# Patient Record
Sex: MALE | Race: BLACK OR AFRICAN AMERICAN | NOT HISPANIC OR LATINO | Employment: OTHER | URBAN - METROPOLITAN AREA
[De-identification: names, ages, dates, MRNs, and addresses within clinical notes are randomized per-mention and may not be internally consistent; named-entity substitution may affect disease eponyms.]

---

## 2023-04-27 ENCOUNTER — HOSPITAL ENCOUNTER (EMERGENCY)
Facility: HOSPITAL | Age: 68
Discharge: HOME | End: 2023-04-27
Attending: EMERGENCY MEDICINE
Payer: MEDICARE

## 2023-04-27 VITALS
TEMPERATURE: 99 F | BODY MASS INDEX: 26.36 KG/M2 | HEART RATE: 72 BPM | OXYGEN SATURATION: 99 % | HEIGHT: 69 IN | DIASTOLIC BLOOD PRESSURE: 87 MMHG | SYSTOLIC BLOOD PRESSURE: 190 MMHG | RESPIRATION RATE: 18 BRPM | WEIGHT: 178 LBS

## 2023-04-27 DIAGNOSIS — M54.50 ACUTE BILATERAL LOW BACK PAIN, UNSPECIFIED WHETHER SCIATICA PRESENT: Primary | ICD-10-CM

## 2023-04-27 DIAGNOSIS — M54.2 NECK PAIN: ICD-10-CM

## 2023-04-27 PROCEDURE — 25000003 PHARM REV CODE 250: Performed by: EMERGENCY MEDICINE

## 2023-04-27 PROCEDURE — 99284 EMERGENCY DEPT VISIT MOD MDM: CPT

## 2023-04-27 RX ORDER — OXYCODONE HYDROCHLORIDE 5 MG/1
15 TABLET ORAL
Status: COMPLETED | OUTPATIENT
Start: 2023-04-27 | End: 2023-04-27

## 2023-04-27 RX ADMIN — OXYCODONE 15 MG: 5 TABLET ORAL at 04:04

## 2023-04-27 NOTE — ED TRIAGE NOTES
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 Pt reports chronic neck and lower back pain which is now exacerbated after injury. Pt denies head injury

## 2023-04-27 NOTE — DISCHARGE INSTRUCTIONS
Xrays x-rays do not show fracture or new injury although they do show arthritis.  Continue to use your pain medication as needed.  Your blood pressure was elevated in the emergency department this may be related to stress or discomfort.  Schedule close follow-up with your primary physician to monitor your blood pressure determine if any blood pressure medications are warranted.    Thank you for coming to our Emergency Department today. It is important to remember that some problems are difficult to diagnose and may not be found during your first visit. Be sure to follow up with your primary care doctor and review any labs/imaging that was performed with them. If you do not have a primary care doctor, you may contact the one listed on your discharge paperwork or you may also call the [facility] Appointment Desk at [phone] to schedule an appointment with one.     All medications may potentially have side effects and it is impossible to predict which medications may give you side effects. If you feel that you are having a negative effect of any medication you should immediately stop taking them and seek medical attention.    Return to the ER with any questions/concerns, new/concerning symptoms, worsening or failure to improve. Do not drive or make any important decisions for 24 hours if you have received any pain medications, sedatives or mood altering drugs during your ER visit.

## 2023-04-27 NOTE — ED PROVIDER NOTES
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 Pt reports chronic neck and lower back pain which is now exacerbated after injury. Pt denies head injury     66yo male with hx of cervical and lumbar degenerative disc disease presents to the ED with neck and back pain beginning after he was struck with a clothing rack in the store causing him to turn quickly and grab onto a pole to prevent himself from from falling. Denies headstrike or LOC. No new numbness or weakness. No bowel or bladder symptoms. No treatments attempted prior to arrival.    Review of patient's allergies indicates:  No Known Allergies  History reviewed. No pertinent past medical history.  History reviewed. No pertinent surgical history.  History reviewed. No pertinent family history.     Review of Systems   Constitutional:  Negative for fever.   HENT:  Negative for facial swelling and sore throat.    Eyes:  Negative for pain and discharge.   Respiratory:  Negative for choking and shortness of breath.    Cardiovascular:  Negative for chest pain.   Gastrointestinal:  Negative for abdominal pain, nausea and vomiting.   Genitourinary:  Negative for dysuria and frequency.   Musculoskeletal:  Positive for back pain and neck pain.   Skin:  Negative for rash.   Neurological:  Negative for weakness and numbness.     Physical Exam     Initial Vitals [04/27/23 1558]   BP Pulse Resp Temp SpO2   (!) 224/109 70 20 97.7 °F (36.5 °C) 100 %      MAP       --         Physical Exam    Nursing note and vitals reviewed.  Constitutional: He is not diaphoretic. No distress.   HENT:   Head: Normocephalic and atraumatic.   Protecting airway   Eyes: Conjunctivae and EOM are normal. No scleral icterus.    Neck: Neck supple. No tracheal deviation present.   Normal range of motion.  Cardiovascular:  Normal rate, regular rhythm and intact distal pulses.           Pulmonary/Chest: No stridor. No respiratory distress.   Speaking in full sentences   Abdominal: Abdomen is soft. He exhibits no distension. There is no abdominal tenderness.   Musculoskeletal:         General: No edema.      Cervical back: Normal range of motion and neck supple.      Comments: Bilateral paraspinal lumbar tenderness  Bilateral paraspinal cervical tenderness.  No midline TTP      Neurological: He is alert. He has normal strength. No cranial nerve deficit or sensory deficit.   Skin: Skin is warm and dry.   Psychiatric: He has a normal mood and affect.       ED Course   Procedures  Labs Reviewed - No data to display       Imaging Results              X-Ray Lumbar Spine Ap And Lateral (Final result)  Result time 04/27/23 16:35:38      Final result by Vinod Jennings MD (04/27/23 16:35:38)                   Impression:      1. No acute displaced fracture or dislocation of the lumbar spine.  2. Questionable height loss involving the superior endplate of T12 versus degenerative change.  No previous exams are available for comparison.  Correlation is advised with any focal tenderness.      Electronically signed by: Vinod Jennings MD  Date:    04/27/2023  Time:    16:35               Narrative:    EXAMINATION:  XR LUMBAR SPINE AP AND LATERAL    CLINICAL HISTORY:  low back pain;    TECHNIQUE:  AP, lateral and spot images were performed of the lumbar spine.    COMPARISON:  None    FINDINGS:  Three views lumbar spine.    Lateral imaging demonstrates minimal grade 1 anterolisthesis of L4 on L5.  No significant vertebral body height loss.  There is disc space height loss involving L5-S1.  There is questionable height loss involving the superior endplate of T12 noting degenerative change at this level may account for the same.  There is multilevel facet  arthropathy.  The visualized sacral segments are aligned.  AP spinal alignment is remarkable for mild dextroscoliotic curvature.  The bilateral sacroiliac joints are intact.  There is vascular calcification.                                       X-Ray Cervical Spine AP And Lateral (Final result)  Result time 04/27/23 16:33:12      Final result by Vinod Jennings MD (04/27/23 16:33:12)                   Impression:      1. No acute displaced fracture or dislocation of the cervical spine.      Electronically signed by: Vinod Jennings MD  Date:    04/27/2023  Time:    16:33               Narrative:    EXAMINATION:  XR CERVICAL SPINE AP LATERAL    CLINICAL HISTORY:  Cervicalgia    TECHNIQUE:  AP, lateral and open mouth views of the cervical spine were performed.    COMPARISON:  None.    FINDINGS:  Four views cervical spine.    Lateral imaging demonstrates adequate alignment of the cervical spine without significant vertebral body height loss.  There is disc space height loss involving C5-C6.  Disc degenerative disease primarily involves C5-C6.  The facet joints are aligned.  AP spinal alignment is unremarkable.  There is facet arthropathy.  The visualized upper lung zones are grossly clear.  The odontoid is intact.  The lateral masses of C1 are in anatomic relationship with C2.  Paraspinous and hypopharyngeal soft tissues are unremarkable.  The airway is patent.                                       Medications   oxyCODONE immediate release tablet 15 mg (15 mg Oral Given 4/27/23 1636)     Medical Decision Making:   History:   Old Medical Records: I decided to obtain old medical records.  Differential Diagnosis:   Includes but not limited to: strain, sprain, fracture, dialocation  Clinical Tests:   Radiological Study: Ordered and Reviewed  ED Management:  Patient is afebrile and in no acute distress at time history and physical.  Vitals within acceptable ranges.  He has no midline vertebral tenderness or step-offs.   He has full and symmetrical strength and sensation in bilateral upper and lower extremities.  Patient is hypertensive.  He denies current headache, chest pain, numbness or weakness.  Patient reports history of elevated blood pressures in the doctor's office.  Reports that he sees is Dr. Bacon and is not on antihypertensives.  X-rays without acute fracture or spondylolisthesis.  There is note of chronic degenerative changes.  Patient given his previously prescribed oxycodone.  On reassessment he reports some improvement in symptoms. He is ambulatory without difficulty. He is stable for discharge on trial of management with his previously prescribed pain medications.  Patient and wife at the bedside counseled on the importance of close follow-up with primary physician for blood pressure checks to monitor blood pressure and determine if he needs systolic blood pressure medications. counseled on supportive care, appropriate medication usage, concerning symptoms for which to return to ER and the importance of follow up. Understanding and agreement with treatment plan was expressed.   This chart was completed using dictation software, as a result there may be some transcription errors.                           Clinical Impression:   Final diagnoses:  [M54.2] Neck pain  [M54.50] Acute bilateral low back pain, unspecified whether sciatica present (Primary)        ED Disposition Condition    Discharge Stable          ED Prescriptions    None       Follow-up Information       Follow up With Specialties Details Why Contact Info    Jacinta King MD Family Medicine Schedule an appointment as soon as possible for a visit   [address] [address] [phone]               Massimo Monsalve MD  04/27/23 0335

## 2023-05-31 ENCOUNTER — HOSPITAL ENCOUNTER (OUTPATIENT)
Facility: HOSPITAL | Age: 68
Discharge: HOME | End: 2023-06-01
Attending: EMERGENCY MEDICINE | Admitting: STUDENT IN AN ORGANIZED HEALTH CARE EDUCATION/TRAINING PROGRAM
Payer: MEDICARE

## 2023-05-31 DIAGNOSIS — Z86.79 HISTORY OF CORONARY ARTERY DISEASE: ICD-10-CM

## 2023-05-31 DIAGNOSIS — R07.9 CHEST PAIN: ICD-10-CM

## 2023-05-31 DIAGNOSIS — R07.9 CHEST PAIN WITH MODERATE RISK FOR CARDIAC ETIOLOGY: Primary | ICD-10-CM

## 2023-05-31 PROBLEM — D50.9 IRON DEFICIENCY ANEMIA: Chronic | Status: ACTIVE | Noted: 2020-02-21

## 2023-05-31 PROBLEM — G72.0 STATIN MYOPATHY: Chronic | Status: ACTIVE | Noted: 2019-10-25

## 2023-05-31 PROBLEM — M10.9 GOUT, UNSPECIFIED: Chronic | Status: ACTIVE | Noted: 2017-01-03

## 2023-05-31 PROBLEM — M79.10 MYALGIA DUE TO STATIN: Chronic | Status: ACTIVE | Noted: 2019-07-26

## 2023-05-31 PROBLEM — G72.0 STATIN MYOPATHY: Status: ACTIVE | Noted: 2019-10-25

## 2023-05-31 PROBLEM — T46.6X5A STATIN MYOPATHY: Chronic | Status: ACTIVE | Noted: 2019-10-25

## 2023-05-31 PROBLEM — T46.6X5A MYALGIA DUE TO STATIN: Chronic | Status: ACTIVE | Noted: 2019-07-26

## 2023-05-31 PROBLEM — M15.9 DEGENERATIVE JOINT DISEASE INVOLVING MULTIPLE JOINTS: Chronic | Status: ACTIVE | Noted: 2018-05-01

## 2023-05-31 PROBLEM — D50.9 IRON DEFICIENCY ANEMIA: Status: ACTIVE | Noted: 2020-02-21

## 2023-05-31 PROBLEM — F11.90 CHRONIC NARCOTIC USE: Chronic | Status: ACTIVE | Noted: 2018-11-01

## 2023-05-31 PROBLEM — E78.5 HLD (HYPERLIPIDEMIA): Chronic | Status: ACTIVE | Noted: 2022-12-27

## 2023-05-31 PROBLEM — I10 ESSENTIAL HYPERTENSION: Chronic | Status: ACTIVE | Noted: 2018-05-01

## 2023-05-31 PROBLEM — E78.5 HLD (HYPERLIPIDEMIA): Status: ACTIVE | Noted: 2022-12-27

## 2023-05-31 PROBLEM — I10 ESSENTIAL HYPERTENSION: Status: ACTIVE | Noted: 2018-05-01

## 2023-05-31 PROBLEM — T46.6X5A STATIN MYOPATHY: Status: ACTIVE | Noted: 2019-10-25

## 2023-05-31 PROBLEM — F41.9 ANXIETY: Status: ACTIVE | Noted: 2023-05-31

## 2023-05-31 PROBLEM — T46.6X5A MYALGIA DUE TO STATIN: Status: ACTIVE | Noted: 2019-07-26

## 2023-05-31 PROBLEM — I25.10 ARTERIOSCLEROSIS OF CORONARY ARTERY: Status: ACTIVE | Noted: 2018-05-01

## 2023-05-31 PROBLEM — M15.9 DEGENERATIVE JOINT DISEASE INVOLVING MULTIPLE JOINTS: Status: ACTIVE | Noted: 2018-05-01

## 2023-05-31 PROBLEM — M79.10 MYALGIA DUE TO STATIN: Status: ACTIVE | Noted: 2019-07-26

## 2023-05-31 PROBLEM — I25.10 ARTERIOSCLEROSIS OF CORONARY ARTERY: Chronic | Status: ACTIVE | Noted: 2018-05-01

## 2023-05-31 PROBLEM — E78.00 PURE HYPERCHOLESTEROLEMIA: Status: ACTIVE | Noted: 2018-05-01

## 2023-05-31 PROBLEM — F41.9 ANXIETY: Chronic | Status: ACTIVE | Noted: 2023-05-31

## 2023-05-31 PROBLEM — F11.90 CHRONIC NARCOTIC USE: Status: ACTIVE | Noted: 2018-11-01

## 2023-05-31 PROBLEM — M10.9 GOUT, UNSPECIFIED: Status: ACTIVE | Noted: 2017-01-03

## 2023-05-31 LAB
ALBUMIN SERPL BCP-MCNC: 4.2 G/DL (ref 3.5–5.2)
ALP SERPL-CCNC: 35 U/L (ref 55–135)
ALT SERPL W/O P-5'-P-CCNC: 18 U/L (ref 10–44)
ANION GAP SERPL CALC-SCNC: 9 MMOL/L (ref 8–16)
AST SERPL-CCNC: 22 U/L (ref 10–40)
BASOPHILS # BLD AUTO: 0.04 K/UL (ref 0–0.2)
BASOPHILS NFR BLD: 0.9 % (ref 0–1.9)
BILIRUB SERPL-MCNC: 0.9 MG/DL (ref 0.1–1)
BNP SERPL-MCNC: <10 PG/ML (ref 0–99)
BUN SERPL-MCNC: 14 MG/DL (ref 8–23)
CALCIUM SERPL-MCNC: 9.3 MG/DL (ref 8.7–10.5)
CHLORIDE SERPL-SCNC: 103 MMOL/L (ref 95–110)
CO2 SERPL-SCNC: 27 MMOL/L (ref 23–29)
CREAT SERPL-MCNC: 1.2 MG/DL (ref 0.5–1.4)
DIFFERENTIAL METHOD: ABNORMAL
EOSINOPHIL # BLD AUTO: 0.4 K/UL (ref 0–0.5)
EOSINOPHIL NFR BLD: 7.9 % (ref 0–8)
ERYTHROCYTE [DISTWIDTH] IN BLOOD BY AUTOMATED COUNT: 12.9 % (ref 11.5–14.5)
EST. GFR  (NO RACE VARIABLE): >60 ML/MIN/1.73 M^2
GLUCOSE SERPL-MCNC: 96 MG/DL (ref 70–110)
HCT VFR BLD AUTO: 38 % (ref 40–54)
HGB BLD-MCNC: 12.2 G/DL (ref 14–18)
IMM GRANULOCYTES # BLD AUTO: 0.01 K/UL (ref 0–0.04)
IMM GRANULOCYTES NFR BLD AUTO: 0.2 % (ref 0–0.5)
LYMPHOCYTES # BLD AUTO: 2.3 K/UL (ref 1–4.8)
LYMPHOCYTES NFR BLD: 51.5 % (ref 18–48)
MAGNESIUM SERPL-MCNC: 1.6 MG/DL (ref 1.6–2.6)
MCH RBC QN AUTO: 25.5 PG (ref 27–31)
MCHC RBC AUTO-ENTMCNC: 32.1 G/DL (ref 32–36)
MCV RBC AUTO: 79 FL (ref 82–98)
MONOCYTES # BLD AUTO: 0.3 K/UL (ref 0.3–1)
MONOCYTES NFR BLD: 6.7 % (ref 4–15)
NEUTROPHILS # BLD AUTO: 1.5 K/UL (ref 1.8–7.7)
NEUTROPHILS NFR BLD: 32.8 % (ref 38–73)
NRBC BLD-RTO: 0 /100 WBC
PLATELET # BLD AUTO: 144 K/UL (ref 150–450)
PMV BLD AUTO: 11.7 FL (ref 9.2–12.9)
POTASSIUM SERPL-SCNC: 3.9 MMOL/L (ref 3.5–5.1)
PROT SERPL-MCNC: 7.2 G/DL (ref 6–8.4)
RBC # BLD AUTO: 4.79 M/UL (ref 4.6–6.2)
SODIUM SERPL-SCNC: 139 MMOL/L (ref 136–145)
TROPONIN I SERPL DL<=0.01 NG/ML-MCNC: <0.006 NG/ML (ref 0–0.03)
WBC # BLD AUTO: 4.45 K/UL (ref 3.9–12.7)

## 2023-05-31 PROCEDURE — 99223 1ST HOSP IP/OBS HIGH 75: CPT | Mod: ,,, | Performed by: INTERNAL MEDICINE

## 2023-05-31 PROCEDURE — 99285 EMERGENCY DEPT VISIT HI MDM: CPT | Mod: 25

## 2023-05-31 PROCEDURE — 84484 ASSAY OF TROPONIN QUANT: CPT | Mod: 91 | Performed by: PHYSICIAN ASSISTANT

## 2023-05-31 PROCEDURE — 63600175 PHARM REV CODE 636 W HCPCS: Performed by: EMERGENCY MEDICINE

## 2023-05-31 PROCEDURE — 93010 EKG 12-LEAD: ICD-10-PCS | Mod: ,,, | Performed by: INTERNAL MEDICINE

## 2023-05-31 PROCEDURE — 80053 COMPREHEN METABOLIC PANEL: CPT | Performed by: PHYSICIAN ASSISTANT

## 2023-05-31 PROCEDURE — 36415 COLL VENOUS BLD VENIPUNCTURE: CPT | Performed by: HOSPITALIST

## 2023-05-31 PROCEDURE — 83880 ASSAY OF NATRIURETIC PEPTIDE: CPT | Performed by: PHYSICIAN ASSISTANT

## 2023-05-31 PROCEDURE — 25000003 PHARM REV CODE 250: Performed by: HOSPITALIST

## 2023-05-31 PROCEDURE — G0378 HOSPITAL OBSERVATION PER HR: HCPCS

## 2023-05-31 PROCEDURE — 96372 THER/PROPH/DIAG INJ SC/IM: CPT | Performed by: EMERGENCY MEDICINE

## 2023-05-31 PROCEDURE — 99223 PR INITIAL HOSPITAL CARE,LEVL III: ICD-10-PCS | Mod: ,,, | Performed by: INTERNAL MEDICINE

## 2023-05-31 PROCEDURE — 93010 ELECTROCARDIOGRAM REPORT: CPT | Mod: ,,, | Performed by: INTERNAL MEDICINE

## 2023-05-31 PROCEDURE — 25000003 PHARM REV CODE 250: Performed by: EMERGENCY MEDICINE

## 2023-05-31 PROCEDURE — 93005 ELECTROCARDIOGRAM TRACING: CPT

## 2023-05-31 PROCEDURE — 85025 COMPLETE CBC W/AUTO DIFF WBC: CPT | Performed by: PHYSICIAN ASSISTANT

## 2023-05-31 PROCEDURE — 83735 ASSAY OF MAGNESIUM: CPT | Performed by: PHYSICIAN ASSISTANT

## 2023-05-31 PROCEDURE — 84484 ASSAY OF TROPONIN QUANT: CPT | Mod: 91 | Performed by: HOSPITALIST

## 2023-05-31 RX ORDER — MAG HYDROX/ALUMINUM HYD/SIMETH 200-200-20
30 SUSPENSION, ORAL (FINAL DOSE FORM) ORAL 4 TIMES DAILY PRN
Status: DISCONTINUED | OUTPATIENT
Start: 2023-05-31 | End: 2023-06-01 | Stop reason: HOSPADM

## 2023-05-31 RX ORDER — OXYCODONE HYDROCHLORIDE 15 MG/1
15 TABLET ORAL EVERY 6 HOURS PRN
Status: DISCONTINUED | OUTPATIENT
Start: 2023-05-31 | End: 2023-06-01 | Stop reason: HOSPADM

## 2023-05-31 RX ORDER — DEXTROSE 40 %
15 GEL (GRAM) ORAL
Status: DISCONTINUED | OUTPATIENT
Start: 2023-05-31 | End: 2023-06-01 | Stop reason: HOSPADM

## 2023-05-31 RX ORDER — OXYCODONE HYDROCHLORIDE 15 MG/1
TABLET ORAL
COMMUNITY
Start: 2023-05-19

## 2023-05-31 RX ORDER — ACETAMINOPHEN 325 MG/1
650 TABLET ORAL EVERY 6 HOURS PRN
Status: DISCONTINUED | OUTPATIENT
Start: 2023-05-31 | End: 2023-06-01 | Stop reason: HOSPADM

## 2023-05-31 RX ORDER — AMLODIPINE BESYLATE 5 MG/1
5 TABLET ORAL DAILY
COMMUNITY
Start: 2022-12-27

## 2023-05-31 RX ORDER — AMLODIPINE BESYLATE 5 MG/1
5 TABLET ORAL DAILY
Status: DISCONTINUED | OUTPATIENT
Start: 2023-06-01 | End: 2023-06-01 | Stop reason: HOSPADM

## 2023-05-31 RX ORDER — NALOXONE HCL 0.4 MG/ML
0.02 VIAL (ML) INJECTION
Status: DISCONTINUED | OUTPATIENT
Start: 2023-05-31 | End: 2023-06-01 | Stop reason: HOSPADM

## 2023-05-31 RX ORDER — DEXTROSE 40 %
30 GEL (GRAM) ORAL
Status: DISCONTINUED | OUTPATIENT
Start: 2023-05-31 | End: 2023-06-01 | Stop reason: HOSPADM

## 2023-05-31 RX ORDER — ASPIRIN 81 MG/1
81 TABLET ORAL DAILY
Status: DISCONTINUED | OUTPATIENT
Start: 2023-06-01 | End: 2023-06-01 | Stop reason: HOSPADM

## 2023-05-31 RX ORDER — ASPIRIN 81 MG/1
81 TABLET ORAL
COMMUNITY

## 2023-05-31 RX ORDER — ONDANSETRON 2 MG/ML
4 INJECTION INTRAMUSCULAR; INTRAVENOUS EVERY 8 HOURS PRN
Status: DISCONTINUED | OUTPATIENT
Start: 2023-05-31 | End: 2023-06-01 | Stop reason: HOSPADM

## 2023-05-31 RX ORDER — SIMETHICONE 80 MG
1 TABLET,CHEWABLE ORAL 4 TIMES DAILY PRN
Status: DISCONTINUED | OUTPATIENT
Start: 2023-05-31 | End: 2023-06-01 | Stop reason: HOSPADM

## 2023-05-31 RX ORDER — PANTOPRAZOLE SODIUM 40 MG/1
80 TABLET, DELAYED RELEASE ORAL
Status: COMPLETED | OUTPATIENT
Start: 2023-05-31 | End: 2023-05-31

## 2023-05-31 RX ORDER — OXYCODONE HYDROCHLORIDE 5 MG/1
15 TABLET ORAL
Status: COMPLETED | OUTPATIENT
Start: 2023-05-31 | End: 2023-05-31

## 2023-05-31 RX ORDER — GLUCAGON 1 MG
1 KIT INJECTION
Status: DISCONTINUED | OUTPATIENT
Start: 2023-05-31 | End: 2023-06-01 | Stop reason: HOSPADM

## 2023-05-31 RX ORDER — SODIUM CHLORIDE 0.9 % (FLUSH) 0.9 %
10 SYRINGE (ML) INJECTION EVERY 8 HOURS PRN
Status: DISCONTINUED | OUTPATIENT
Start: 2023-05-31 | End: 2023-06-01 | Stop reason: HOSPADM

## 2023-05-31 RX ORDER — HYDROMORPHONE HYDROCHLORIDE 1 MG/ML
1 INJECTION, SOLUTION INTRAMUSCULAR; INTRAVENOUS; SUBCUTANEOUS
Status: COMPLETED | OUTPATIENT
Start: 2023-05-31 | End: 2023-05-31

## 2023-05-31 RX ORDER — POLYETHYLENE GLYCOL 3350 17 G/17G
17 POWDER, FOR SOLUTION ORAL DAILY
Status: DISCONTINUED | OUTPATIENT
Start: 2023-05-31 | End: 2023-06-01 | Stop reason: HOSPADM

## 2023-05-31 RX ORDER — TALC
6 POWDER (GRAM) TOPICAL NIGHTLY PRN
Status: DISCONTINUED | OUTPATIENT
Start: 2023-05-31 | End: 2023-06-01 | Stop reason: HOSPADM

## 2023-05-31 RX ORDER — MAG HYDROX/ALUMINUM HYD/SIMETH 200-200-20
60 SUSPENSION, ORAL (FINAL DOSE FORM) ORAL
Status: COMPLETED | OUTPATIENT
Start: 2023-05-31 | End: 2023-05-31

## 2023-05-31 RX ORDER — PROCHLORPERAZINE EDISYLATE 5 MG/ML
5 INJECTION INTRAMUSCULAR; INTRAVENOUS EVERY 6 HOURS PRN
Status: DISCONTINUED | OUTPATIENT
Start: 2023-05-31 | End: 2023-06-01 | Stop reason: HOSPADM

## 2023-05-31 RX ADMIN — OXYCODONE HYDROCHLORIDE 15 MG: 5 TABLET ORAL at 08:05

## 2023-05-31 RX ADMIN — ALUMINUM HYDROXIDE, MAGNESIUM HYDROXIDE, AND DIMETHICONE 60 ML: 200; 20; 200 SUSPENSION ORAL at 08:05

## 2023-05-31 RX ADMIN — PANTOPRAZOLE SODIUM 80 MG: 40 TABLET, DELAYED RELEASE ORAL at 08:05

## 2023-05-31 RX ADMIN — OXYCODONE HYDROCHLORIDE 15 MG: 15 TABLET ORAL at 11:05

## 2023-05-31 RX ADMIN — HYDROMORPHONE HYDROCHLORIDE 1 MG: 1 INJECTION, SOLUTION INTRAMUSCULAR; INTRAVENOUS; SUBCUTANEOUS at 08:05

## 2023-05-31 NOTE — ASSESSMENT & PLAN NOTE
Chest pain in a patient with past medical history significant for coronary artery disease.  Will do further evaluation with the help of a stress test.  Check 2D echocardiogram.

## 2023-05-31 NOTE — HPI
67 y.o. male with HTN, HLD, and CAD presents with a complaint of chest pain.  Pain is acute onset yesterday evening, located mid chest, attempted self treatment with alkaseltzer with some relief.  Denies fever, chills, cough, SOB, palpitations, orthopnea, PND, dizziness, syncope, n/v/d, or abdominal pain.  Pain resolved with GI cocktail in ED.  EKG without evidence of acute ischemia, troponin negative, otherwise negative for acute abnormality.  Cardiology consulted and recommended observation with stress test tomorrow.

## 2023-05-31 NOTE — CONSULTS
[facility] - Telemetry  Cardiology  Consult Note    Patient Name: Ronald Kelley  MRN: [id number]  Admission Date: 5/31/2023  Hospital Length of Stay: 0 days  Code Status: Full Code   Attending Provider: Rj Riley MD   Consulting Provider: Krupa Claudio MD  Primary Care Physician: Jacinta King MD  Principal Problem:Chest pain    Patient information was obtained from patient and ER records.     Inpatient consult to Cardiology  Consult performed by: Krupa Claudio MD  Consult ordered by: Abel Pa MD        Subjective:     Chief Complaint:  cp     HPI:   Patient is a pleasant 67-year-old man.  Has a past medical history significant for coronary artery disease status post PCI in 2011.  Came in with substernal chest pressure-like chest pain.  States this pain woke him up and he took some antacids, but despite that when he kept on having the pain he came to the ER.  Currently chest pain-free.  Troponins have been negative.  States the pain is different than the pain from his PCI in 2011 which was more like a sharp stabbing pain.  Today it is more like substernal pressure-like pain.  He denies that this is burning-like sensation            Past Medical History:   Diagnosis Date    Coronary artery disease     Hypertension        Past Surgical History:   Procedure Laterality Date    CORONARY STENT PLACEMENT         Review of patient's allergies indicates:  No Known Allergies    No current facility-administered medications on file prior to encounter.     Current Outpatient Medications on File Prior to Encounter   Medication Sig    aspirin (ECOTRIN) 81 MG EC tablet Take 81 mg by mouth.    oxyCODONE (ROXICODONE) 15 MG Tab Take by mouth.    amLODIPine (NORVASC) 5 MG tablet Take 5 mg by mouth once daily.     Family History    None       Tobacco Use    Smoking status: Never    Smokeless tobacco: Never   Substance and Sexual Activity    Alcohol use: Not on file    Drug use: Never    Sexual activity: Not  on file     Review of Systems   Constitutional: Negative.   HENT: Negative.     Eyes: Negative.    Cardiovascular:  Positive for chest pain.   Respiratory: Negative.     Endocrine: Negative.    Hematologic/Lymphatic: Negative.    Skin: Negative.    Musculoskeletal: Negative.    Gastrointestinal: Negative.    Genitourinary: Negative.    Neurological: Negative.    Psychiatric/Behavioral: Negative.     Allergic/Immunologic: Negative.    Objective:     Vital Signs (Most Recent):  Temp: 97.4 °F (36.3 °C) (05/31/23 1627)  Pulse: 65 (05/31/23 1627)  Resp: 18 (05/31/23 1627)  BP: (!) 189/89 (05/31/23 1627)  SpO2: 98 % (05/31/23 1627) Vital Signs (24h Range):  Temp:  [97.4 °F (36.3 °C)-98.5 °F (36.9 °C)] 97.4 °F (36.3 °C)  Pulse:  [50-67] 65  Resp:  [10-18] 18  SpO2:  [96 %-100 %] 98 %  BP: (139-199)/(79-94) 189/89     Weight: 81.6 kg (180 lb)  Body mass index is 26.58 kg/m².    SpO2: 98 %       No intake or output data in the 24 hours ending 05/31/23 1759    Lines/Drains/Airways       Peripheral Intravenous Line  Duration                  Peripheral IV - Single Lumen 05/31/23 0716 20 G Right Antecubital <1 day                     Physical Exam  Vitals reviewed.   Constitutional:       Appearance: He is well-developed.   HENT:      Head: Normocephalic.   Eyes:      Conjunctiva/sclera: Conjunctivae normal.      Pupils: Pupils are equal, round, and reactive to light.   Cardiovascular:      Rate and Rhythm: Normal rate and regular rhythm.      Heart sounds: Normal heart sounds.   Pulmonary:      Effort: Pulmonary effort is normal.      Breath sounds: Normal breath sounds.   Abdominal:      General: Bowel sounds are normal.      Palpations: Abdomen is soft.   Musculoskeletal:      Cervical back: Normal range of motion and neck supple.   Skin:     General: Skin is warm.   Neurological:      Mental Status: He is alert and oriented to person, place, and time.        Significant Labs: BMP:   Recent Labs   Lab 05/31/23  0716   GLU 96       K 3.9      CO2 27   BUN 14   CREATININE 1.2   CALCIUM 9.3   MG 1.6   , CMP   Recent Labs   Lab 05/31/23  0716      K 3.9      CO2 27   GLU 96   BUN 14   CREATININE 1.2   CALCIUM 9.3   PROT 7.2   ALBUMIN 4.2   BILITOT 0.9   ALKPHOS 35*   AST 22   ALT 18   ANIONGAP 9   , CBC   Recent Labs   Lab 05/31/23  0716   WBC 4.45   HGB 12.2*   HCT 38.0*   *   , INR No results for input(s): INR, PROTIME in the last 48 hours., Lipid Panel No results for input(s): CHOL, HDL, LDLCALC, TRIG, CHOLHDL in the last 48 hours., Troponin   Recent Labs   Lab 05/31/23  0716 05/31/23  1005   TROPONINI <0.006 <0.006   , and All pertinent lab results from the last 24 hours have been reviewed.    Significant Imaging: Echocardiogram: Transthoracic echo (TTE) complete (Cupid Only): No results found for this or any previous visit.    Assessment and Plan:     * Chest pain  Chest pain in a patient with past medical history significant for coronary artery disease.  Will do further evaluation with the help of a stress test.  Check 2D echocardiogram.    Essential hypertension        HLD (hyperlipidemia)  As per patient has been intolerant to multiple statins as well as Repatha.  His primary cardiologist has discussed leqvio with the patient as per the notes, but the patient does not remember that conversation.  Consider it as an outpatient        VTE Risk Mitigation (From admission, onward)         Ordered     IP VTE LOW RISK PATIENT  Once         05/31/23 1505     Place sequential compression device  Until discontinued         05/31/23 1505                Thank you for your consult. I will follow-up with patient. Please contact us if you have any additional questions.    Krupa Claudio MD  Cardiology   [facility] [place] [facility] [place]

## 2023-05-31 NOTE — ED PROVIDER NOTES
Encounter Date: 5/31/2023    SCRIBE #1 NOTE: I, Juan Purdy, am scribing for, and in the presence of,  Abel Pa MD. Other sections scribed: HPI, ROS.     History     Chief Complaint   Patient presents with    Chest Pain     Pt presents to the ED with c/o  sub-sternal and left-sided CP since approx 2200 last night. States he took massiel-seltzer with mild relief. Denies n/v. Hx of previous stent placements     CC: Chest pain    HPI: History is provided by independent historian. This is a 67 y.o. M who presents to the ED for emergent evaluation of acute mid-sternal CP that began at 12:00 am today. Pt believed that the CP was due to gas prompting him to take Massiel [place] with minimal relief. The pt also complains of acute mid thoracic back pain that began this morning. He describes the back pain as a pressure-like sensation when sitting up. The pt endorses left side CP. There are no exacerbating factors. Pt denies that the CP is exacerbated with deep breathing. He took an Aspirin today. The pt reports a similar episode of CP 10 years ago, in which a cardiac stent was placed at that time. The pt has chronic back pain that radiates to bilateral lower extremities. The pain in the left lower extremity is greater than in the right lower extremity. No pain in bilateral lower extremities currently. The pt's chronic back pain is managed with Oxycodone, which he has not taken as of yet today. Pt denies fever, chills, diaphoresis, ear pain, sore throat, eye problem, cough, SOB, abdominal pain, nausea, vomiting, diarrhea, dysuria, arm or leg problems, rash, headache, or tobacco use.    The history is provided by the patient. No  was used.   Review of patient's allergies indicates:  No Known Allergies  History reviewed. No pertinent past medical history.  History reviewed. No pertinent surgical history.  History reviewed. No pertinent family history.  Social History     Tobacco Use    Smoking status:  Never    Smokeless tobacco: Never   Substance Use Topics    Drug use: Never     Review of Systems   Constitutional:  Negative for chills, diaphoresis and fever.   HENT:  Negative for sore throat.    Respiratory:  Negative for cough and shortness of breath.    Cardiovascular:  Positive for chest pain.   Gastrointestinal:  Negative for abdominal pain, diarrhea, nausea and vomiting.   Genitourinary:  Negative for dysuria.   Musculoskeletal:  Positive for back pain. Negative for myalgias.   Skin:  Negative for rash.   Neurological:  Negative for weakness and headaches.   Hematological:  Does not bruise/bleed easily.     Physical Exam     Initial Vitals [05/31/23 0657]   BP Pulse Resp Temp SpO2   (!) 141/86 65 16 98.2 °F (36.8 °C) 100 %      MAP       --         Physical Exam  The patient was examined specifically for the following:   General:No significant distress, Good color, Warm and dry. Head and neck:Scalp atraumatic, Neck supple. Neurological:Appropriate conversation, Gross motor deficits. Eyes:Conjugate gaze, Clear corneas. ENT: No epistaxis. Cardiac: Regular rate and rhythm, Grossly normal heart tones. Pulmonary: Wheezing, Rales. Gastrointestinal: Abdominal tenderness, Abdominal distention. Musculoskeletal: Extremity deformity, Apparent pain with range of motion of the joints. Skin: Rash.   The findings on examination were normal .  The patient has brisk bilateral radial pulses.  Lungs are clear and free of wheezing rales rubs or rhonchi.  Heart tones are normal.  The patient has regular rate and rhythm.  The abdomen is nontender.  There is no guarding rebound mass or distention.  ED Course   Critical Care    Date/Time: 6/25/2023 10:41 AM  Performed by: Abel Pa MD  Authorized by: Rj Riley MD   Direct patient critical care time: 22 minutes  Additional history critical care time: 11 minutes  Ordering / reviewing critical care time: 11 minutes  Documentation critical care time: 11  minutes  Consulting other physicians critical care time: 5 minutes  Total critical care time (exclusive of procedural time) : 60 minutes  Critical care time was exclusive of separately billable procedures and treating other patients and teaching time.  Critical care was necessary to treat or prevent imminent or life-threatening deterioration of the following conditions: metabolic crisis and CNS failure or compromise.  Critical care was time spent personally by me on the following activities: discussions with primary provider, development of treatment plan with patient or surrogate, evaluation of patient's response to treatment, examination of patient, obtaining history from patient or surrogate, ordering and performing treatments and interventions, ordering and review of laboratory studies, ordering and review of radiographic studies, pulse oximetry, re-evaluation of patient's condition and review of old charts.      Labs Reviewed   CBC W/ AUTO DIFFERENTIAL - Abnormal; Notable for the following components:       Result Value    Hemoglobin 12.2 (*)     Hematocrit 38.0 (*)     MCV 79 (*)     MCH 25.5 (*)     Platelets 144 (*)     Gran # (ANC) 1.5 (*)     Gran % 32.8 (*)     Lymph % 51.5 (*)     All other components within normal limits   COMPREHENSIVE METABOLIC PANEL - Abnormal; Notable for the following components:    Alkaline Phosphatase 35 (*)     All other components within normal limits   TROPONIN I   B-TYPE NATRIURETIC PEPTIDE   MAGNESIUM   TROPONIN I     EKG Readings: (Independently Interpreted)   This patient is in a normal sinus rhythm with a heart rate of 67.  There are no significant ST segment or T-wave changes.  There is no evidence of acute myocardial infarction or malignant arrhythmia.  There is poor R-wave progression across precordium.  The axis is normal.  Intervals are normal.     Imaging Results              X-Ray Chest AP Portable (Final result)  Result time 05/31/23 08:05:05      Final result by  Luisito Guan MD (05/31/23 08:05:05)                   Impression:      No acute abnormality.      Electronically signed by: Luisito Guan MD  Date:    05/31/2023  Time:    08:05               Narrative:    EXAMINATION:  XR CHEST AP PORTABLE    CLINICAL HISTORY:  Chest Pain;    TECHNIQUE:  Single view of the chest were performed.    COMPARISON:  Chest radiograph dated January 8, 2011    FINDINGS:  The trachea and cardiomediastinal silhouette are within normal limits.  There is no evidence of pleural effusions, pneumothoraces or consolidations.  Lungs are clear.  Osseous structures demonstrate no evidence for acute fractures or dislocations.                                    Medical decision making: Given the above this patient presents emergency room with dull heavy chest pressure that began at midnight last night lasted about 2 hours.  The patient has known coronary artery disease.  He has a stent.  EKG does not show ST segment elevation myocardial infarction, 2 troponins are -3 hours apart.  I doubt myocardial infarction.  This could have been anginal chest pain.  The patient has a heart score of 4.  I recommended consult with Cardiology and consideration of hospital admission.  The patient flatly declined both cardiology consult and admission to the hospital.  He would like to see his cardiologist.  Office evaluation before the weekend was recommended.  The patient is undertaking to arrange that now.   After deliberation, the family inpatient decided to accept cardiology consultation.  The patient was seen by  in the emergency room.  Recommended admission.  I will place the patient to observation.  I will continue to trend troponins.  I will defer further care and treatment to Hospital Medicine.         Medications   HYDROmorphone injection 1 mg (1 mg Intramuscular Given 5/31/23 0809)   oxyCODONE immediate release tablet 15 mg (15 mg Oral Given 5/31/23 0808)   pantoprazole EC tablet 80 mg (80 mg Oral Given  5/31/23 0808)   aluminum-magnesium hydroxide-simethicone 200-200-20 mg/5 mL suspension 60 mL (60 mLs Oral Given 5/31/23 0808)        Additional MDM:   Heart Score:    History:          Slightly suspicious.  ECG:             Normal  Age:               >65 years  Risk factors: >= 3 risk factors or history of atherosclerotic disease  Troponin:       Less than or equal to normal limit  Final Score: 4       Scribe Attestation:   Scribe #1: I performed the above scribed service and the documentation accurately describes the services I performed. I attest to the accuracy of the note.                 Scribe I personally performed the services described in this documentation.  All medical record  entries made by the scribe are at my direction and in my presence.  Signed, Dr. Pa  Clinical Impression:   Final diagnoses:  [R07.9] Chest pain  [Z86.79] History of coronary artery disease (Primary)  [R07.9] Chest pain with moderate risk for cardiac etiology        ED Disposition Condition    Observation Stable                Abel Pa MD  05/31/23 1636       Abel Pa MD  06/25/23 1042

## 2023-05-31 NOTE — SUBJECTIVE & OBJECTIVE
Past Medical History:   Diagnosis Date    Coronary artery disease     Hypertension        Past Surgical History:   Procedure Laterality Date    CORONARY STENT PLACEMENT         Review of patient's allergies indicates:  No Known Allergies    No current facility-administered medications on file prior to encounter.     Current Outpatient Medications on File Prior to Encounter   Medication Sig    aspirin (ECOTRIN) 81 MG EC tablet Take 81 mg by mouth.    oxyCODONE (ROXICODONE) 15 MG Tab Take by mouth.    amLODIPine (NORVASC) 5 MG tablet Take 5 mg by mouth once daily.     Family History    None       Tobacco Use    Smoking status: Never    Smokeless tobacco: Never   Substance and Sexual Activity    Alcohol use: Not on file    Drug use: Never    Sexual activity: Not on file     Review of Systems   Constitutional: Negative.   HENT: Negative.     Eyes: Negative.    Cardiovascular:  Positive for chest pain.   Respiratory: Negative.     Endocrine: Negative.    Hematologic/Lymphatic: Negative.    Skin: Negative.    Musculoskeletal: Negative.    Gastrointestinal: Negative.    Genitourinary: Negative.    Neurological: Negative.    Psychiatric/Behavioral: Negative.     Allergic/Immunologic: Negative.    Objective:     Vital Signs (Most Recent):  Temp: 97.4 °F (36.3 °C) (05/31/23 1627)  Pulse: 65 (05/31/23 1627)  Resp: 18 (05/31/23 1627)  BP: (!) 189/89 (05/31/23 1627)  SpO2: 98 % (05/31/23 1627) Vital Signs (24h Range):  Temp:  [97.4 °F (36.3 °C)-98.5 °F (36.9 °C)] 97.4 °F (36.3 °C)  Pulse:  [50-67] 65  Resp:  [10-18] 18  SpO2:  [96 %-100 %] 98 %  BP: (139-199)/(79-94) 189/89     Weight: 81.6 kg (180 lb)  Body mass index is 26.58 kg/m².    SpO2: 98 %       No intake or output data in the 24 hours ending 05/31/23 1759    Lines/Drains/Airways       Peripheral Intravenous Line  Duration                  Peripheral IV - Single Lumen 05/31/23 0716 20 G Right Antecubital <1 day                     Physical Exam  Vitals reviewed.    Constitutional:       Appearance: He is well-developed.   HENT:      Head: Normocephalic.   Eyes:      Conjunctiva/sclera: Conjunctivae normal.      Pupils: Pupils are equal, round, and reactive to light.   Cardiovascular:      Rate and Rhythm: Normal rate and regular rhythm.      Heart sounds: Normal heart sounds.   Pulmonary:      Effort: Pulmonary effort is normal.      Breath sounds: Normal breath sounds.   Abdominal:      General: Bowel sounds are normal.      Palpations: Abdomen is soft.   Musculoskeletal:      Cervical back: Normal range of motion and neck supple.   Skin:     General: Skin is warm.   Neurological:      Mental Status: He is alert and oriented to person, place, and time.        Significant Labs: BMP:   Recent Labs   Lab 05/31/23  0716   GLU 96      K 3.9      CO2 27   BUN 14   CREATININE 1.2   CALCIUM 9.3   MG 1.6   , CMP   Recent Labs   Lab 05/31/23  0716      K 3.9      CO2 27   GLU 96   BUN 14   CREATININE 1.2   CALCIUM 9.3   PROT 7.2   ALBUMIN 4.2   BILITOT 0.9   ALKPHOS 35*   AST 22   ALT 18   ANIONGAP 9   , CBC   Recent Labs   Lab 05/31/23  0716   WBC 4.45   HGB 12.2*   HCT 38.0*   *   , INR No results for input(s): INR, PROTIME in the last 48 hours., Lipid Panel No results for input(s): CHOL, HDL, LDLCALC, TRIG, CHOLHDL in the last 48 hours., Troponin   Recent Labs   Lab 05/31/23  0716 05/31/23  1005   TROPONINI <0.006 <0.006   , and All pertinent lab results from the last 24 hours have been reviewed.    Significant Imaging: Echocardiogram: Transthoracic echo (TTE) complete (Cupid Only): No results found for this or any previous visit.

## 2023-05-31 NOTE — ED TRIAGE NOTES
Pt presents to the ED with c/o sub-sternal and left-sided CP since approx 2200 last night that radiates to mid back. Pt states he took velvet-seltzer with mild relief. Denies N/V, SOB, HA, bladder bowel issues. Hx of previous stent placements.   Pt AAOX4

## 2023-05-31 NOTE — HPI
Patient is a pleasant 67-year-old man.  Has a past medical history significant for coronary artery disease status post PCI in 2011.  Came in with substernal chest pressure-like chest pain.  States this pain woke him up and he took some antacids, but despite that when he kept on having the pain he came to the ER.  Currently chest pain-free.  Troponins have been negative.  States the pain is different than the pain from his PCI in 2011 which was more like a sharp stabbing pain.  Today it is more like substernal pressure-like pain.  He denies that this is burning-like sensation

## 2023-05-31 NOTE — ASSESSMENT & PLAN NOTE
As per patient has been intolerant to multiple statins as well as Repatha.  His primary cardiologist has discussed leqvio with the patient as per the notes, but the patient does not remember that conversation.  Consider it as an outpatient

## 2023-06-01 VITALS
HEART RATE: 60 BPM | HEIGHT: 69 IN | WEIGHT: 180 LBS | TEMPERATURE: 97 F | DIASTOLIC BLOOD PRESSURE: 82 MMHG | RESPIRATION RATE: 18 BRPM | OXYGEN SATURATION: 100 % | BODY MASS INDEX: 26.66 KG/M2 | SYSTOLIC BLOOD PRESSURE: 154 MMHG

## 2023-06-01 LAB
AORTIC ROOT ANNULUS: 4.27 CM
AORTIC VALVE CUSP SEPERATION: 2.54 CM
ASCENDING AORTA: 3.66 CM
AV INDEX (PROSTH): 0.83
AV MEAN GRADIENT: 4 MMHG
AV PEAK GRADIENT: 8 MMHG
AV VALVE AREA: 3.67 CM2
AV VELOCITY RATIO: 0.71
BSA FOR ECHO PROCEDURE: 1.99 M2
CHOLEST SERPL-MCNC: 242 MG/DL (ref 120–199)
CHOLEST/HDLC SERPL: 5.4 {RATIO} (ref 2–5)
CV ECHO LV RWT: 0.45 CM
CV STRESS BASE HR: 62 BPM
DIASTOLIC BLOOD PRESSURE: 93 MMHG
DOP CALC AO PEAK VEL: 1.41 M/S
DOP CALC AO VTI: 28.8 CM
DOP CALC LVOT AREA: 4.4 CM2
DOP CALC LVOT DIAMETER: 2.38 CM
DOP CALC LVOT PEAK VEL: 1 M/S
DOP CALC LVOT STROKE VOLUME: 105.83 CM3
DOP CALCLVOT PEAK VEL VTI: 23.8 CM
E WAVE DECELERATION TIME: 284.64 MSEC
E/A RATIO: 0.78
E/E' RATIO: 8.47 M/S
ECHO LV POSTERIOR WALL: 0.98 CM (ref 0.6–1.1)
EJECTION FRACTION: 60 %
FRACTIONAL SHORTENING: 33 % (ref 28–44)
HDLC SERPL-MCNC: 45 MG/DL (ref 40–75)
HDLC SERPL: 18.6 % (ref 20–50)
INTERVENTRICULAR SEPTUM: 1.02 CM (ref 0.6–1.1)
IVC DIAMETER: 1.33 CM
IVRT: 163.65 MSEC
LA MAJOR: 4.89 CM
LA MINOR: 5.06 CM
LA WIDTH: 3.7 CM
LDLC SERPL CALC-MCNC: 171 MG/DL (ref 63–159)
LEFT ATRIUM SIZE: 3.45 CM
LEFT ATRIUM VOLUME INDEX: 27.3 ML/M2
LEFT ATRIUM VOLUME: 53.96 CM3
LEFT INTERNAL DIMENSION IN SYSTOLE: 2.92 CM (ref 2.1–4)
LEFT VENTRICLE DIASTOLIC VOLUME INDEX: 44.02 ML/M2
LEFT VENTRICLE DIASTOLIC VOLUME: 87.15 ML
LEFT VENTRICLE MASS INDEX: 74 G/M2
LEFT VENTRICLE SYSTOLIC VOLUME INDEX: 16.5 ML/M2
LEFT VENTRICLE SYSTOLIC VOLUME: 32.67 ML
LEFT VENTRICULAR INTERNAL DIMENSION IN DIASTOLE: 4.39 CM (ref 3.5–6)
LEFT VENTRICULAR MASS: 147.29 G
LV LATERAL E/E' RATIO: 6 M/S
LV SEPTAL E/E' RATIO: 14.4 M/S
LVOT MG: 1.98 MMHG
LVOT MV: 0.66 CM/S
MV PEAK A VEL: 0.92 M/S
MV PEAK E VEL: 0.72 M/S
MV STENOSIS PRESSURE HALF TIME: 82.54 MS
MV VALVE AREA P 1/2 METHOD: 2.67 CM2
NONHDLC SERPL-MCNC: 197 MG/DL
NUC REST EJECTION FRACTION: 72
OHS CV CPX 85 PERCENT MAX PREDICTED HEART RATE MALE: 130
OHS CV CPX MAX PREDICTED HEART RATE: 153
OHS CV CPX PATIENT IS FEMALE: 0
OHS CV CPX PATIENT IS MALE: 1
OHS CV CPX PEAK DIASTOLIC BLOOD PRESSURE: 93 MMHG
OHS CV CPX PEAK HEAR RATE: 94 BPM
OHS CV CPX PEAK RATE PRESSURE PRODUCT: NORMAL
OHS CV CPX PEAK SYSTOLIC BLOOD PRESSURE: 186 MMHG
OHS CV CPX PERCENT MAX PREDICTED HEART RATE ACHIEVED: 61
OHS CV CPX RATE PRESSURE PRODUCT PRESENTING: NORMAL
PISA TR MAX VEL: 2.54 M/S
PULM VEIN S/D RATIO: 1.55
PV PEAK D VEL: 0.62 M/S
PV PEAK S VEL: 0.96 M/S
PV PEAK VELOCITY: 1.14 CM/S
RA MAJOR: 4.3 CM
RA PRESSURE: 3 MMHG
RA WIDTH: 3.1 CM
RIGHT VENTRICULAR END-DIASTOLIC DIMENSION: 3.94 CM
RV TISSUE DOPPLER FREE WALL SYSTOLIC VELOCITY 1 (APICAL 4 CHAMBER VIEW): 0.01 CM/S
SINUS: 3.8 CM
STJ: 3.09 CM
SYSTOLIC BLOOD PRESSURE: 189 MMHG
TDI LATERAL: 0.12 M/S
TDI SEPTAL: 0.05 M/S
TDI: 0.09 M/S
TR MAX PG: 26 MMHG
TRICUSPID ANNULAR PLANE SYSTOLIC EXCURSION: 2.65 CM
TRIGL SERPL-MCNC: 130 MG/DL (ref 30–150)
TROPONIN I SERPL DL<=0.01 NG/ML-MCNC: <0.006 NG/ML (ref 0–0.03)
TV PEAK GRADIENT: 2.74 MMHG
TV REST PULMONARY ARTERY PRESSURE: 29 MMHG

## 2023-06-01 PROCEDURE — 80061 LIPID PANEL: CPT | Performed by: HOSPITALIST

## 2023-06-01 PROCEDURE — 99499 UNLISTED E&M SERVICE: CPT | Mod: ,,, | Performed by: INTERNAL MEDICINE

## 2023-06-01 PROCEDURE — 63600175 PHARM REV CODE 636 W HCPCS: Performed by: INTERNAL MEDICINE

## 2023-06-01 PROCEDURE — 84484 ASSAY OF TROPONIN QUANT: CPT | Performed by: HOSPITALIST

## 2023-06-01 PROCEDURE — 99499 NO LOS: ICD-10-PCS | Mod: ,,, | Performed by: INTERNAL MEDICINE

## 2023-06-01 PROCEDURE — G0378 HOSPITAL OBSERVATION PER HR: HCPCS

## 2023-06-01 PROCEDURE — 25000003 PHARM REV CODE 250: Performed by: HOSPITALIST

## 2023-06-01 PROCEDURE — 36415 COLL VENOUS BLD VENIPUNCTURE: CPT | Performed by: HOSPITALIST

## 2023-06-01 RX ORDER — REGADENOSON 0.08 MG/ML
0.4 INJECTION, SOLUTION INTRAVENOUS ONCE
Status: COMPLETED | OUTPATIENT
Start: 2023-06-01 | End: 2023-06-01

## 2023-06-01 RX ADMIN — ASPIRIN 81 MG: 81 TABLET, COATED ORAL at 09:06

## 2023-06-01 RX ADMIN — OXYCODONE HYDROCHLORIDE 15 MG: 15 TABLET ORAL at 11:06

## 2023-06-01 RX ADMIN — AMLODIPINE BESYLATE 5 MG: 5 TABLET ORAL at 11:06

## 2023-06-01 RX ADMIN — REGADENOSON 0.4 MG: 0.08 INJECTION, SOLUTION INTRAVENOUS at 09:06

## 2023-06-01 NOTE — ASSESSMENT & PLAN NOTE
Chest pain in a patient with past medical history significant for coronary artery disease.  Will do further evaluation with the help of a stress test.  Check 2D echocardiogram.    6/1:  Stress test did not show any significant ischemia.  Echo showed normal left ventricle systolic function.  No further workup indicated during this hospitalization.  Will sign off, follow-up in Cardiology clinic

## 2023-06-01 NOTE — NURSING
Patient returned to room from NM via wc. Patient awake, alert, oriented with c/o back pain 8/10.Tele monitoring in progress. Will give prn pain medication. Food order in place, kitchen notified.

## 2023-06-01 NOTE — PROGRESS NOTES
[facility] - Telemetry  Cardiology  Progress Note    Patient Name: Ronald Kelley  MRN: [id number]  Admission Date: 5/31/2023  Hospital Length of Stay: 0 days  Code Status: Full Code   Attending Physician: Rj Riley MD   Primary Care Physician: Jacinta King MD  Expected Discharge Date:   Principal Problem:Chest pain    Subjective:       Interval History:  Stress test did not show any significant ischemia.      Review of Systems   Constitutional: Negative.   HENT: Negative.     Eyes: Negative.    Cardiovascular: Negative.    Respiratory: Negative.     Endocrine: Negative.    Hematologic/Lymphatic: Negative.    Skin: Negative.    Musculoskeletal: Negative.    Gastrointestinal: Negative.    Genitourinary: Negative.    Neurological: Negative.    Psychiatric/Behavioral: Negative.     Allergic/Immunologic: Negative.    Objective:     Vital Signs (Most Recent):  Temp: 97.4 °F (36.3 °C) (06/01/23 1137)  Pulse: 60 (06/01/23 1137)  Resp: 18 (06/01/23 1137)  BP: (!) 180/84 (06/01/23 1150)  SpO2: 100 % (06/01/23 1137) Vital Signs (24h Range):  Temp:  [97.4 °F (36.3 °C)-98.5 °F (36.9 °C)] 97.4 °F (36.3 °C)  Pulse:  [56-67] 60  Resp:  [17-18] 18  SpO2:  [98 %-100 %] 100 %  BP: (126-199)/(67-94) 180/84     Weight: 81.6 kg (180 lb)  Body mass index is 26.58 kg/m².     SpO2: 100 %         Intake/Output Summary (Last 24 hours) at 6/1/2023 1339  Last data filed at 5/31/2023 2348  Gross per 24 hour   Intake 320 ml   Output --   Net 320 ml       Lines/Drains/Airways       Peripheral Intravenous Line  Duration                  Peripheral IV - Single Lumen 05/31/23 0716 20 G Right Antecubital 1 day                       Physical Exam  Vitals reviewed.   Constitutional:       Appearance: He is well-developed.   HENT:      Head: Normocephalic.   Eyes:      Conjunctiva/sclera: Conjunctivae normal.      Pupils: Pupils are equal, round, and reactive to light.   Cardiovascular:      Rate and Rhythm: Normal rate and regular rhythm.       Heart sounds: Normal heart sounds.   Pulmonary:      Effort: Pulmonary effort is normal.      Breath sounds: Normal breath sounds.   Abdominal:      General: Bowel sounds are normal.      Palpations: Abdomen is soft.   Musculoskeletal:      Cervical back: Normal range of motion and neck supple.   Skin:     General: Skin is warm.   Neurological:      Mental Status: He is alert and oriented to person, place, and time.          Significant Labs: BMP:   Recent Labs   Lab 05/31/23  0716   GLU 96      K 3.9      CO2 27   BUN 14   CREATININE 1.2   CALCIUM 9.3   MG 1.6   , CMP   Recent Labs   Lab 05/31/23  0716      K 3.9      CO2 27   GLU 96   BUN 14   CREATININE 1.2   CALCIUM 9.3   PROT 7.2   ALBUMIN 4.2   BILITOT 0.9   ALKPHOS 35*   AST 22   ALT 18   ANIONGAP 9   , CBC   Recent Labs   Lab 05/31/23  0716   WBC 4.45   HGB 12.2*   HCT 38.0*   *   , INR No results for input(s): INR, PROTIME in the last 48 hours., Lipid Panel   Recent Labs   Lab 06/01/23  0532   CHOL 242*   HDL 45   LDLCALC 171.0*   TRIG 130   CHOLHDL 18.6*   , Troponin   Recent Labs   Lab 05/31/23  1005 05/31/23  1832 06/01/23  0017   TROPONINI <0.006 <0.006 <0.006   , and All pertinent lab results from the last 24 hours have been reviewed.    Significant Imaging: Echocardiogram: Transthoracic echo (TTE) complete (Cupid Only):   Results for orders placed or performed during the hospital encounter of 05/31/23   Echo   Result Value Ref Range    BSA 1.99 m2    LA WIDTH 3.70 cm    RA Width 3.10 cm    TDI SEPTAL 0.05 m/s    LV LATERAL E/E' RATIO 6.00 m/s    LV SEPTAL E/E' RATIO 14.40 m/s    IVC diameter 1.33 cm    Left Ventricular Outflow Tract Mean Velocity 0.66 cm/s    Left Ventricular Outflow Tract Mean Gradient 1.98 mmHg    AORTIC VALVE CUSP SEPERATION 2.54 cm    TDI LATERAL 0.12 m/s    PV PEAK VELOCITY 1.14 cm/s    LVIDd 4.39 3.5 - 6.0 cm    IVS 1.02 0.6 - 1.1 cm    Posterior Wall 0.98 0.6 - 1.1 cm    Ao root annulus 4.27 cm     LVIDs 2.92 2.1 - 4.0 cm    FS 33 28 - 44 %    LA volume 53.96 cm3    Sinus 3.80 cm    STJ 3.09 cm    Ascending aorta 3.66 cm    LV mass 147.29 g    LA size 3.45 cm    RVDD 3.94 cm    TAPSE 2.65 cm    RV S' 0.01 cm/s    Left Ventricle Relative Wall Thickness 0.45 cm    AV mean gradient 4 mmHg    AV valve area 3.67 cm2    AV Velocity Ratio 0.71     AV index (prosthetic) 0.83     MV valve area p 1/2 method 2.67 cm2    E/A ratio 0.78     Mean e' 0.09 m/s    E wave deceleration time 284.64 msec    IVRT 163.65 msec    Pulm vein S/D ratio 1.55     LVOT diameter 2.38 cm    LVOT area 4.4 cm2    LVOT peak brody 1.00 m/s    LVOT peak VTI 23.80 cm    Ao peak brody 1.41 m/s    Ao VTI 28.8 cm    LVOT stroke volume 105.83 cm3    AV peak gradient 8 mmHg    TV peak gradient 2.74 mmHg    E/E' ratio 8.47 m/s    MV Peak E Brody 0.72 m/s    TR Max Brody 2.54 m/s    MV stenosis pressure 1/2 time 82.54 ms    MV Peak A Brody 0.92 m/s    PV Peak S Brody 0.96 m/s    PV Peak D Brody 0.62 m/s    LV Systolic Volume 32.67 mL    LV Systolic Volume Index 16.5 mL/m2    LV Diastolic Volume 87.15 mL    LV Diastolic Volume Index 44.02 mL/m2    LA Volume Index 27.3 mL/m2    LV Mass Index 74 g/m2    RA Major Axis 4.30 cm    Left Atrium Minor Axis 5.06 cm    Left Atrium Major Axis 4.89 cm    Triscuspid Valve Regurgitation Peak Gradient 26 mmHg    Right Atrial Pressure (from IVC) 3 mmHg    EF 60 %    TV rest pulmonary artery pressure 29 mmHg    Narrative    · The estimated ejection fraction is 60%.  · The left ventricle is normal in size with normal systolic function.  · Normal left ventricular diastolic function.  · Normal right ventricular size with normal right ventricular systolic   function.  · Mild left atrial enlargement.  · Normal central venous pressure (3 mmHg).  · The estimated PA systolic pressure is 29 mmHg.        Assessment and Plan:     Brief HPI:     * Chest pain  Chest pain in a patient with past medical history significant for coronary artery  disease.  Will do further evaluation with the help of a stress test.  Check 2D echocardiogram.    6/1:  Stress test did not show any significant ischemia.  Echo showed normal left ventricle systolic function.  No further workup indicated during this hospitalization.  Will sign off, follow-up in Cardiology clinic    Essential hypertension        HLD (hyperlipidemia)  As per patient has been intolerant to multiple statins as well as Repatha.  His primary cardiologist has discussed leqvio with the patient as per the notes, but the patient does not remember that conversation.  Consider it as an outpatient        VTE Risk Mitigation (From admission, onward)         Ordered     IP VTE LOW RISK PATIENT  Once         05/31/23 1505     Place sequential compression device  Until discontinued         05/31/23 1505                Krupa Claudio MD  Cardiology  [facility] - Telemetry

## 2023-06-01 NOTE — ASSESSMENT & PLAN NOTE
Atypical pain without evidence of acute ischemia on EKG and negative troponin, currently pain free. Monitor on tele, obtain serial troponin, Cardiology consulted, appreciate recs, NPO p MN

## 2023-06-01 NOTE — HOSPITAL COURSE
Ronald Kelley  was placed under observation for ACS rule out.    Throughout his observation stay, Mr. Kelley  continue to endorse that he was chest pain-free.   Troponins were trended and were negative x2.  Cardiology was consulted, due to his past medical history significant for CAD, stress test and echocardiogram were ordered. Echocardiogram shows normal size and function with EF 60%. Stress test was noted to show normal scan without evidence of myocardial infarction or ischemia. Cardiology has cleared patient for discharge. Patient is to follow up in Cardiology clinic. BP was noted to be elevated following return from cardiac testing, but prior to discharge was noted to be 154/82.    All findings and plan were explained to the patient. All questions and concerns were answered. Patient verbalized understanding. Patient is in stable condition to d/c home and has been informed to follow up with his PCP within the next 7-10 days to discuss his observation stay and to follow-up with Cardiology. Patient has been educated to return to the ED if he experiences any further chest pain, shortness of breath, lightheadness, weakness, or discomfort.

## 2023-06-01 NOTE — PLAN OF CARE
06/01/23 1430   Final Note   Assessment Type Final Discharge Note   Anticipated Discharge Disposition Home   Hospital Resources/Appts/Education Provided Appointments scheduled and added to AVS   Post-Acute Status   Post-Acute Authorization Other   Other Status No Post-Acute Service Needs     Pts nurse Isamar notified that the pt can d/c from CM standpoint

## 2023-06-01 NOTE — NURSING
Discharge instructions given to patient and spouse at bedside. Patient verbalized understanding of instructions. Patient states willingness to comply. Saline lock removed. Tele monitoring removed.  Patient escorted by RN to family vehicle for discharge home. Patient accompanied by spouse. No apparent distress noted.

## 2023-06-01 NOTE — DISCHARGE SUMMARY
[facility] Medicine  Discharge Summary      Patient Name: Ronald Kelley  MRN: [id number]  RAJESH: [id number]  Patient Class: OP- Observation  Admission Date: 5/31/2023  Hospital Length of Stay: 0 days  Discharge Date and Time:  06/01/2023 2:47 PM  Attending Physician: Rj Riley MD   Discharging Provider: Adria Wiggins PA-C  Primary Care Provider: Jacinta King MD    Primary Care Team: ADRIA WIGGINS    HPI:   67 y.o. male with HTN, HLD, and CAD presents with a complaint of chest pain.  Pain is acute onset yesterday evening, located mid chest, attempted self treatment with alkaseltzer with some relief.  Denies fever, chills, cough, SOB, palpitations, orthopnea, PND, dizziness, syncope, n/v/d, or abdominal pain.  Pain resolved with GI cocktail in ED.  EKG without evidence of acute ischemia, troponin negative, otherwise negative for acute abnormality.  Cardiology consulted and recommended observation with stress test tomorrow.      * No surgery found *      Hospital Course:   Ronald Kelley  was placed under observation for ACS rule out.    Throughout his observation stay, Mr. Kelley  continue to endorse that he was chest pain-free.   Troponins were trended and were negative x2.  Cardiology was consulted, due to his past medical history significant for CAD, stress test and echocardiogram were ordered. Echocardiogram shows normal size and function with EF 60%. Stress test was noted to show normal scan without evidence of myocardial infarction or ischemia. Cardiology has cleared patient for discharge. Patient is to follow up in Cardiology clinic. BP was noted to be elevated following return from cardiac testing, but prior to discharge was noted to be 154/82.    All findings and plan were explained to the patient. All questions and concerns were answered. Patient verbalized understanding. Patient is in stable condition to d/c home and has been informed to follow up with his PCP within  the next 7-10 days to discuss his observation stay and to follow-up with Cardiology. Patient has been educated to return to the ED if he experiences any further chest pain, shortness of breath, lightheadness, weakness, or discomfort.       Goals of Care Treatment Preferences:  Code Status: Full Code      Consults:   Consults (From admission, onward)        Status Ordering Provider     Inpatient consult to Cardiology  Once        Provider:  Krupa Claudio MD    Completed EVARISTO COHEN          No new Assessment & Plan notes have been filed under this hospital service since the last note was generated.  Service: Hospital Medicine    Final Active Diagnoses:    Diagnosis Date Noted POA    PRINCIPAL PROBLEM:  Chest pain [R07.9] 05/31/2023 Yes    HLD (hyperlipidemia) [E78.5] 12/27/2022 Yes     Chronic    Essential hypertension [I10] 05/01/2018 Yes     Chronic      Problems Resolved During this Admission:       Discharged Condition: stable    Disposition: Home or Self Care    Follow Up:   Follow-up Information     Jacinta King MD Follow up on 6/19/2023.    Specialty: Family Medicine  Why: please keep already scheduled follow up appointment as prior to coming to the hospital  Contact information:  [address]  [address]  [facility] [address]  [phone]             Vesta Killian MD Follow up on 6/6/2023.    Why: please keep already scheduled follow up appointment as prior to coming to the hospital  Contact information:  Vesta Killian MD    [address]    [place] [address]    [phone]                     Patient Instructions:      Ambulatory referral/consult to Cardiology   Standing Status: Future   Referral Priority: Routine Referral Type: Consultation   Referral Reason: Specialty Services Required   Requested Specialty: Cardiology   Number of Visits Requested: 1     Diet Cardiac     Activity as tolerated       Significant Diagnostic Studies: Labs:   BMP:   Recent Labs   Lab 05/31/23  0716   GLU  96      K 3.9      CO2 27   BUN 14   CREATININE 1.2   CALCIUM 9.3   MG 1.6     CMP   Recent Labs   Lab 05/31/23  0716      K 3.9      CO2 27   GLU 96   BUN 14   CREATININE 1.2   CALCIUM 9.3   PROT 7.2   ALBUMIN 4.2   BILITOT 0.9   ALKPHOS 35*   AST 22   ALT 18   ANIONGAP 9     CBC   Recent Labs   Lab 05/31/23  0716   WBC 4.45   HGB 12.2*   HCT 38.0*   *     Lipid Panel   Lab Results   Component Value Date    CHOL 242 (H) 06/01/2023    HDL 45 06/01/2023    LDLCALC 171.0 (H) 06/01/2023    TRIG 130 06/01/2023    CHOLHDL 18.6 (L) 06/01/2023     Troponin   Recent Labs   Lab 05/31/23  1005 05/31/23  1832 06/01/23  0017   TROPONINI <0.006 <0.006 <0.006     Cardiac Graphics: Echocardiogram:   Transthoracic echo (TTE) complete (Cupid Only):   Results for orders placed or performed during the hospital encounter of 05/31/23   Echo   Result Value Ref Range    BSA 1.99 m2    LA WIDTH 3.70 cm    RA Width 3.10 cm    TDI SEPTAL 0.05 m/s    LV LATERAL E/E' RATIO 6.00 m/s    LV SEPTAL E/E' RATIO 14.40 m/s    IVC diameter 1.33 cm    Left Ventricular Outflow Tract Mean Velocity 0.66 cm/s    Left Ventricular Outflow Tract Mean Gradient 1.98 mmHg    AORTIC VALVE CUSP SEPERATION 2.54 cm    TDI LATERAL 0.12 m/s    PV PEAK VELOCITY 1.14 cm/s    LVIDd 4.39 3.5 - 6.0 cm    IVS 1.02 0.6 - 1.1 cm    Posterior Wall 0.98 0.6 - 1.1 cm    Ao root annulus 4.27 cm    LVIDs 2.92 2.1 - 4.0 cm    FS 33 28 - 44 %    LA volume 53.96 cm3    Sinus 3.80 cm    STJ 3.09 cm    Ascending aorta 3.66 cm    LV mass 147.29 g    LA size 3.45 cm    RVDD 3.94 cm    TAPSE 2.65 cm    RV S' 0.01 cm/s    Left Ventricle Relative Wall Thickness 0.45 cm    AV mean gradient 4 mmHg    AV valve area 3.67 cm2    AV Velocity Ratio 0.71     AV index (prosthetic) 0.83     MV valve area p 1/2 method 2.67 cm2    E/A ratio 0.78     Mean e' 0.09 m/s    E wave deceleration time 284.64 msec    IVRT 163.65 msec    Pulm vein S/D ratio 1.55     LVOT diameter  2.38 cm    LVOT area 4.4 cm2    LVOT peak brody 1.00 m/s    LVOT peak VTI 23.80 cm    Ao peak brody 1.41 m/s    Ao VTI 28.8 cm    LVOT stroke volume 105.83 cm3    AV peak gradient 8 mmHg    TV peak gradient 2.74 mmHg    E/E' ratio 8.47 m/s    MV Peak E Brody 0.72 m/s    TR Max Brody 2.54 m/s    MV stenosis pressure 1/2 time 82.54 ms    MV Peak A Brody 0.92 m/s    PV Peak S Brody 0.96 m/s    PV Peak D Brody 0.62 m/s    LV Systolic Volume 32.67 mL    LV Systolic Volume Index 16.5 mL/m2    LV Diastolic Volume 87.15 mL    LV Diastolic Volume Index 44.02 mL/m2    LA Volume Index 27.3 mL/m2    LV Mass Index 74 g/m2    RA Major Axis 4.30 cm    Left Atrium Minor Axis 5.06 cm    Left Atrium Major Axis 4.89 cm    Triscuspid Valve Regurgitation Peak Gradient 26 mmHg    Right Atrial Pressure (from IVC) 3 mmHg    EF 60 %    TV rest pulmonary artery pressure 29 mmHg    Narrative    · The estimated ejection fraction is 60%.  · The left ventricle is normal in size with normal systolic function.  · Normal left ventricular diastolic function.  · Normal right ventricular size with normal right ventricular systolic   function.  · Mild left atrial enlargement.  · Normal central venous pressure (3 mmHg).  · The estimated PA systolic pressure is 29 mmHg.          Pending Diagnostic Studies:     None         Medications:  Reconciled Home Medications:      Medication List      CONTINUE taking these medications    amLODIPine 5 MG tablet  Commonly known as: NORVASC  Take 5 mg by mouth once daily.     aspirin 81 MG EC tablet  Commonly known as: ECOTRIN  Take 81 mg by mouth.     oxyCODONE 15 MG Tab  Commonly known as: ROXICODONE  Take by mouth.          Results for orders placed during the hospital encounter of 05/31/23    Nuclear Stress - Cardiology Interpreted    Interpretation Summary    Normal myocardial perfusion scan. There is no evidence of myocardial ischemia or infarction.    There is a  mild intensity fixed perfusion abnormality in the inferior  wall of the left ventricle secondary to diaphragm attenuation.    The gated perfusion images showed an ejection fraction of 72% at rest.    There is normal wall motion at rest and post stress.    The ECG portion of the study is negative for ischemia.    The patient reported no chest pain during the stress test.    There were no arrhythmias during stress.      Indwelling Lines/Drains at time of discharge:   Lines/Drains/Airways     None                 Time spent on the discharge of patient: 55 minutes      Adria Wiggins PA-C  Department of Hospital Medicine  [facility]  06/01/2023

## 2023-06-01 NOTE — PLAN OF CARE
Problem: Adult Inpatient Plan of Care  Goal: Plan of Care Review  Outcome: Met  Goal: Patient-Specific Goal (Individualized)  Outcome: Met  Goal: Absence of Hospital-Acquired Illness or Injury  Outcome: Met  Goal: Optimal Comfort and Wellbeing  Outcome: Met  Goal: Readiness for Transition of Care  Outcome: Met     Problem: Infection  Goal: Absence of Infection Signs and Symptoms  Outcome: Met     Problem: Chest Pain  Goal: Resolution of Chest Pain Symptoms  Outcome: Met

## 2023-06-01 NOTE — PLAN OF CARE
Pt is AAOx4. Room air. Tele maintained.   NPO p MN maintained.   No falls or injuries reported during shift, safety precautions maintained.     Problem: Adult Inpatient Plan of Care  Goal: Plan of Care Review  Outcome: Ongoing, Progressing     Problem: Adult Inpatient Plan of Care  Goal: Patient-Specific Goal (Individualized)  Outcome: Ongoing, Progressing

## 2023-06-01 NOTE — NURSING
Patient to NM via wc as ordered. Patient awake, alert, oriented without c/o discomfort at this time. No apparent distress noted.

## 2023-06-01 NOTE — SUBJECTIVE & OBJECTIVE
Interval History:  Stress test did not show any significant ischemia.      Review of Systems   Constitutional: Negative.   HENT: Negative.     Eyes: Negative.    Cardiovascular: Negative.    Respiratory: Negative.     Endocrine: Negative.    Hematologic/Lymphatic: Negative.    Skin: Negative.    Musculoskeletal: Negative.    Gastrointestinal: Negative.    Genitourinary: Negative.    Neurological: Negative.    Psychiatric/Behavioral: Negative.     Allergic/Immunologic: Negative.    Objective:     Vital Signs (Most Recent):  Temp: 97.4 °F (36.3 °C) (06/01/23 1137)  Pulse: 60 (06/01/23 1137)  Resp: 18 (06/01/23 1137)  BP: (!) 180/84 (06/01/23 1150)  SpO2: 100 % (06/01/23 1137) Vital Signs (24h Range):  Temp:  [97.4 °F (36.3 °C)-98.5 °F (36.9 °C)] 97.4 °F (36.3 °C)  Pulse:  [56-67] 60  Resp:  [17-18] 18  SpO2:  [98 %-100 %] 100 %  BP: (126-199)/(67-94) 180/84     Weight: 81.6 kg (180 lb)  Body mass index is 26.58 kg/m².     SpO2: 100 %         Intake/Output Summary (Last 24 hours) at 6/1/2023 1339  Last data filed at 5/31/2023 2348  Gross per 24 hour   Intake 320 ml   Output --   Net 320 ml       Lines/Drains/Airways       Peripheral Intravenous Line  Duration                  Peripheral IV - Single Lumen 05/31/23 0716 20 G Right Antecubital 1 day                       Physical Exam  Vitals reviewed.   Constitutional:       Appearance: He is well-developed.   HENT:      Head: Normocephalic.   Eyes:      Conjunctiva/sclera: Conjunctivae normal.      Pupils: Pupils are equal, round, and reactive to light.   Cardiovascular:      Rate and Rhythm: Normal rate and regular rhythm.      Heart sounds: Normal heart sounds.   Pulmonary:      Effort: Pulmonary effort is normal.      Breath sounds: Normal breath sounds.   Abdominal:      General: Bowel sounds are normal.      Palpations: Abdomen is soft.   Musculoskeletal:      Cervical back: Normal range of motion and neck supple.   Skin:     General: Skin is warm.   Neurological:       Mental Status: He is alert and oriented to person, place, and time.          Significant Labs: BMP:   Recent Labs   Lab 05/31/23  0716   GLU 96      K 3.9      CO2 27   BUN 14   CREATININE 1.2   CALCIUM 9.3   MG 1.6   , CMP   Recent Labs   Lab 05/31/23  0716      K 3.9      CO2 27   GLU 96   BUN 14   CREATININE 1.2   CALCIUM 9.3   PROT 7.2   ALBUMIN 4.2   BILITOT 0.9   ALKPHOS 35*   AST 22   ALT 18   ANIONGAP 9   , CBC   Recent Labs   Lab 05/31/23  0716   WBC 4.45   HGB 12.2*   HCT 38.0*   *   , INR No results for input(s): INR, PROTIME in the last 48 hours., Lipid Panel   Recent Labs   Lab 06/01/23  0532   CHOL 242*   HDL 45   LDLCALC 171.0*   TRIG 130   CHOLHDL 18.6*   , Troponin   Recent Labs   Lab 05/31/23  1005 05/31/23  1832 06/01/23  0017   TROPONINI <0.006 <0.006 <0.006   , and All pertinent lab results from the last 24 hours have been reviewed.    Significant Imaging: Echocardiogram: Transthoracic echo (TTE) complete (Cupid Only):   Results for orders placed or performed during the hospital encounter of 05/31/23   Echo   Result Value Ref Range    BSA 1.99 m2    LA WIDTH 3.70 cm    RA Width 3.10 cm    TDI SEPTAL 0.05 m/s    LV LATERAL E/E' RATIO 6.00 m/s    LV SEPTAL E/E' RATIO 14.40 m/s    IVC diameter 1.33 cm    Left Ventricular Outflow Tract Mean Velocity 0.66 cm/s    Left Ventricular Outflow Tract Mean Gradient 1.98 mmHg    AORTIC VALVE CUSP SEPERATION 2.54 cm    TDI LATERAL 0.12 m/s    PV PEAK VELOCITY 1.14 cm/s    LVIDd 4.39 3.5 - 6.0 cm    IVS 1.02 0.6 - 1.1 cm    Posterior Wall 0.98 0.6 - 1.1 cm    Ao root annulus 4.27 cm    LVIDs 2.92 2.1 - 4.0 cm    FS 33 28 - 44 %    LA volume 53.96 cm3    Sinus 3.80 cm    STJ 3.09 cm    Ascending aorta 3.66 cm    LV mass 147.29 g    LA size 3.45 cm    RVDD 3.94 cm    TAPSE 2.65 cm    RV S' 0.01 cm/s    Left Ventricle Relative Wall Thickness 0.45 cm    AV mean gradient 4 mmHg    AV valve area 3.67 cm2    AV Velocity Ratio 0.71      AV index (prosthetic) 0.83     MV valve area p 1/2 method 2.67 cm2    E/A ratio 0.78     Mean e' 0.09 m/s    E wave deceleration time 284.64 msec    IVRT 163.65 msec    Pulm vein S/D ratio 1.55     LVOT diameter 2.38 cm    LVOT area 4.4 cm2    LVOT peak brody 1.00 m/s    LVOT peak VTI 23.80 cm    Ao peak brody 1.41 m/s    Ao VTI 28.8 cm    LVOT stroke volume 105.83 cm3    AV peak gradient 8 mmHg    TV peak gradient 2.74 mmHg    E/E' ratio 8.47 m/s    MV Peak E Brody 0.72 m/s    TR Max Brody 2.54 m/s    MV stenosis pressure 1/2 time 82.54 ms    MV Peak A Brody 0.92 m/s    PV Peak S Brody 0.96 m/s    PV Peak D Brody 0.62 m/s    LV Systolic Volume 32.67 mL    LV Systolic Volume Index 16.5 mL/m2    LV Diastolic Volume 87.15 mL    LV Diastolic Volume Index 44.02 mL/m2    LA Volume Index 27.3 mL/m2    LV Mass Index 74 g/m2    RA Major Axis 4.30 cm    Left Atrium Minor Axis 5.06 cm    Left Atrium Major Axis 4.89 cm    Triscuspid Valve Regurgitation Peak Gradient 26 mmHg    Right Atrial Pressure (from IVC) 3 mmHg    EF 60 %    TV rest pulmonary artery pressure 29 mmHg    Narrative    · The estimated ejection fraction is 60%.  · The left ventricle is normal in size with normal systolic function.  · Normal left ventricular diastolic function.  · Normal right ventricular size with normal right ventricular systolic   function.  · Mild left atrial enlargement.  · Normal central venous pressure (3 mmHg).  · The estimated PA systolic pressure is 29 mmHg.

## 2023-06-01 NOTE — NURSING
[facility]  [place]urses Note -- 4 Eyes      5/31/2023       Skin assessed on: Q Shift      [x] No Pressure Injuries Present    [x]Prevention Measures Documented    [] Yes LDA  for Pressure Injury Previously documented     [] Yes New Pressure Injury Discovered   [] LDA for New Pressure Injury Added      Attending RN:  JENNIFER SWANSON RN     Second RN:  Aurelio HOWARD RN

## 2023-06-01 NOTE — H&P
[facility] Medicine  History & Physical    Patient Name: Ronald Kelley  MRN: [id number]  Patient Class: OP- Observation  Admission Date: 5/31/2023  Attending Physician: Rj Riley MD   Primary Care Provider: Jacinta King MD         Patient information was obtained from patient, past medical records and ER records.     Subjective:     Principal Problem:Chest pain    Chief Complaint:   Chief Complaint   Patient presents with    Chest Pain     Pt presents to the ED with c/o  sub-sternal and left-sided CP since approx 2200 last night. States he took velvet-seltzer with mild relief. Denies n/v. Hx of previous stent placements        HPI: 67 y.o. male with HTN, HLD, and CAD presents with a complaint of chest pain.  Pain is acute onset yesterday evening, located mid chest, attempted self treatment with alkaseltzer with some relief.  Denies fever, chills, cough, SOB, palpitations, orthopnea, PND, dizziness, syncope, n/v/d, or abdominal pain.  Pain resolved with GI cocktail in ED.  EKG without evidence of acute ischemia, troponin negative, otherwise negative for acute abnormality.  Cardiology consulted and recommended observation with stress test tomorrow.      Past Medical History:   Diagnosis Date    Coronary artery disease     Hypertension        Past Surgical History:   Procedure Laterality Date    CORONARY STENT PLACEMENT         Review of patient's allergies indicates:  No Known Allergies    No current facility-administered medications on file prior to encounter.     Current Outpatient Medications on File Prior to Encounter   Medication Sig    aspirin (ECOTRIN) 81 MG EC tablet Take 81 mg by mouth.    oxyCODONE (ROXICODONE) 15 MG Tab Take by mouth.    amLODIPine (NORVASC) 5 MG tablet Take 5 mg by mouth once daily.     Family History    None       Tobacco Use    Smoking status: Never    Smokeless tobacco: Never   Substance and Sexual Activity    Alcohol use: Not on file    Drug use:  Never    Sexual activity: Not on file     Review of Systems   Constitutional:  Negative for chills and fever.   HENT:  Negative for congestion and rhinorrhea.    Eyes:  Negative for photophobia and visual disturbance.   Respiratory:  Negative for cough and shortness of breath.    Cardiovascular:  Positive for chest pain. Negative for palpitations and leg swelling.   Gastrointestinal:  Negative for abdominal pain, diarrhea, nausea and vomiting.   Genitourinary:  Negative for frequency, hematuria and urgency.   Skin:  Negative for pallor, rash and wound.   Neurological:  Negative for light-headedness and headaches.   Psychiatric/Behavioral:  Negative for confusion and decreased concentration.    Objective:     Vital Signs (Most Recent):  Temp: 97.4 °F (36.3 °C) (05/31/23 1627)  Pulse: 65 (05/31/23 1627)  Resp: 18 (05/31/23 1627)  BP: (!) 189/89 (05/31/23 1627)  SpO2: 98 % (05/31/23 1627) Vital Signs (24h Range):  Temp:  [97.4 °F (36.3 °C)-98.5 °F (36.9 °C)] 97.4 °F (36.3 °C)  Pulse:  [50-67] 65  Resp:  [10-18] 18  SpO2:  [96 %-100 %] 98 %  BP: (139-199)/(79-94) 189/89     Weight: 81.6 kg (180 lb)  Body mass index is 26.58 kg/m².     Physical Exam  Vitals and nursing note reviewed.   Constitutional:       General: He is not in acute distress.     Appearance: He is well-developed.   HENT:      Head: Normocephalic and atraumatic.      Right Ear: External ear normal.      Left Ear: External ear normal.      Nose: Nose normal.   Eyes:      Conjunctiva/sclera: Conjunctivae normal.   Cardiovascular:      Rate and Rhythm: Normal rate and regular rhythm.   Pulmonary:      Effort: Pulmonary effort is normal. No respiratory distress.   Abdominal:      General: There is no distension.      Palpations: Abdomen is soft.   Musculoskeletal:         General: Normal range of motion.   Skin:     General: Skin is warm and dry.   Neurological:      Mental Status: He is alert and oriented to person, place, and time.   Psychiatric:          Thought Content: Thought content normal.              Significant Labs: All pertinent labs within the past 24 hours have been reviewed.    Significant Imaging: I have reviewed all pertinent imaging results/findings within the past 24 hours.    Assessment/Plan:     * Chest pain  Atypical pain without evidence of acute ischemia on EKG and negative troponin, currently pain free. Monitor on tele, obtain serial troponin, Cardiology consulted, appreciate recs, NPO p MN    Essential hypertension  Well controlled, continue home medications and monitor blood pressure, adjust as needed.     HLD (hyperlipidemia)  Not on statin, defer to PCP and Cards, continue Cardiac diet.      VTE Risk Mitigation (From admission, onward)         Ordered     IP VTE LOW RISK PATIENT  Once         05/31/23 1505     Place sequential compression device  Until discontinued         05/31/23 1505                     On 05/31/2023, patient should be placed in hospital observation services under my care in collaboration with Rj Riley MD .    Tyrone Driver Jr., APRN, [facility]-BC  Hospitalist - Department of Hospital Medicine  [facility] [address] [place] [address]  Office #: [phone]; Pager #: [phone]

## 2023-06-01 NOTE — PLAN OF CARE
05/31/23 1526   Discharge Planning   Assessment Type Discharge Planning Brief Assessment   Resource/Environmental Concerns none   Support Systems Spouse/significant other   Assistance Needed None   Equipment Currently Used at Home none   Current Living Arrangements home   Care Facility Name n/a   Patient/Family Anticipates Transition to home   Patient/Family Anticipated Services at Transition outpatient care  (PCP and/or cardiology followup)   DME Needed Upon Discharge  none   Discharge Plan A Home with family   Discharge Plan B Home       [id number] [place]  [address] [place] [address] AT [place] [id number] [facility]  [place] [address]  Phone: [phone] Fax: [phone]

## 2023-06-01 NOTE — SUBJECTIVE & OBJECTIVE
Past Medical History:   Diagnosis Date    Coronary artery disease     Hypertension        Past Surgical History:   Procedure Laterality Date    CORONARY STENT PLACEMENT         Review of patient's allergies indicates:  No Known Allergies    No current facility-administered medications on file prior to encounter.     Current Outpatient Medications on File Prior to Encounter   Medication Sig    aspirin (ECOTRIN) 81 MG EC tablet Take 81 mg by mouth.    oxyCODONE (ROXICODONE) 15 MG Tab Take by mouth.    amLODIPine (NORVASC) 5 MG tablet Take 5 mg by mouth once daily.     Family History    None       Tobacco Use    Smoking status: Never    Smokeless tobacco: Never   Substance and Sexual Activity    Alcohol use: Not on file    Drug use: Never    Sexual activity: Not on file     Review of Systems   Constitutional:  Negative for chills and fever.   HENT:  Negative for congestion and rhinorrhea.    Eyes:  Negative for photophobia and visual disturbance.   Respiratory:  Negative for cough and shortness of breath.    Cardiovascular:  Positive for chest pain. Negative for palpitations and leg swelling.   Gastrointestinal:  Negative for abdominal pain, diarrhea, nausea and vomiting.   Genitourinary:  Negative for frequency, hematuria and urgency.   Skin:  Negative for pallor, rash and wound.   Neurological:  Negative for light-headedness and headaches.   Psychiatric/Behavioral:  Negative for confusion and decreased concentration.    Objective:     Vital Signs (Most Recent):  Temp: 97.4 °F (36.3 °C) (05/31/23 1627)  Pulse: 65 (05/31/23 1627)  Resp: 18 (05/31/23 1627)  BP: (!) 189/89 (05/31/23 1627)  SpO2: 98 % (05/31/23 1627) Vital Signs (24h Range):  Temp:  [97.4 °F (36.3 °C)-98.5 °F (36.9 °C)] 97.4 °F (36.3 °C)  Pulse:  [50-67] 65  Resp:  [10-18] 18  SpO2:  [96 %-100 %] 98 %  BP: (139-199)/(79-94) 189/89     Weight: 81.6 kg (180 lb)  Body mass index is 26.58 kg/m².     Physical Exam  Vitals and nursing note reviewed.    Constitutional:       General: He is not in acute distress.     Appearance: He is well-developed.   HENT:      Head: Normocephalic and atraumatic.      Right Ear: External ear normal.      Left Ear: External ear normal.      Nose: Nose normal.   Eyes:      Conjunctiva/sclera: Conjunctivae normal.   Cardiovascular:      Rate and Rhythm: Normal rate and regular rhythm.   Pulmonary:      Effort: Pulmonary effort is normal. No respiratory distress.   Abdominal:      General: There is no distension.      Palpations: Abdomen is soft.   Musculoskeletal:         General: Normal range of motion.   Skin:     General: Skin is warm and dry.   Neurological:      Mental Status: He is alert and oriented to person, place, and time.   Psychiatric:         Thought Content: Thought content normal.              Significant Labs: All pertinent labs within the past 24 hours have been reviewed.    Significant Imaging: I have reviewed all pertinent imaging results/findings within the past 24 hours.

## 2023-08-11 ENCOUNTER — OFFICE VISIT (OUTPATIENT)
Dept: CARDIOLOGY | Facility: CLINIC | Age: 68
End: 2023-08-11
Payer: MEDICARE

## 2023-08-11 VITALS
SYSTOLIC BLOOD PRESSURE: 144 MMHG | OXYGEN SATURATION: 99 % | HEART RATE: 61 BPM | DIASTOLIC BLOOD PRESSURE: 76 MMHG | HEIGHT: 69 IN | WEIGHT: 175.63 LBS | RESPIRATION RATE: 18 BRPM | BODY MASS INDEX: 26.01 KG/M2

## 2023-08-11 DIAGNOSIS — I10 ESSENTIAL HYPERTENSION: Chronic | ICD-10-CM

## 2023-08-11 DIAGNOSIS — E78.5 HYPERLIPIDEMIA, UNSPECIFIED HYPERLIPIDEMIA TYPE: Chronic | ICD-10-CM

## 2023-08-11 DIAGNOSIS — I25.10 ARTERIOSCLEROSIS OF CORONARY ARTERY: Chronic | ICD-10-CM

## 2023-08-11 DIAGNOSIS — Z01.810 PREOPERATIVE CARDIOVASCULAR EXAMINATION: Primary | ICD-10-CM

## 2023-08-11 PROCEDURE — 99214 PR OFFICE/OUTPT VISIT, EST, LEVL IV, 30-39 MIN: ICD-10-PCS | Mod: S$GLB,,, | Performed by: INTERNAL MEDICINE

## 2023-08-11 PROCEDURE — 3078F PR MOST RECENT DIASTOLIC BLOOD PRESSURE < 80 MM HG: ICD-10-PCS | Mod: CPTII,S$GLB,, | Performed by: INTERNAL MEDICINE

## 2023-08-11 PROCEDURE — 1159F PR MEDICATION LIST DOCUMENTED IN MEDICAL RECORD: ICD-10-PCS | Mod: CPTII,S$GLB,, | Performed by: INTERNAL MEDICINE

## 2023-08-11 PROCEDURE — 1101F PR PT FALLS ASSESS DOC 0-1 FALLS W/OUT INJ PAST YR: ICD-10-PCS | Mod: CPTII,S$GLB,, | Performed by: INTERNAL MEDICINE

## 2023-08-11 PROCEDURE — 1160F RVW MEDS BY RX/DR IN RCRD: CPT | Mod: CPTII,S$GLB,, | Performed by: INTERNAL MEDICINE

## 2023-08-11 PROCEDURE — 3077F PR MOST RECENT SYSTOLIC BLOOD PRESSURE >= 140 MM HG: ICD-10-PCS | Mod: CPTII,S$GLB,, | Performed by: INTERNAL MEDICINE

## 2023-08-11 PROCEDURE — 1159F MED LIST DOCD IN RCRD: CPT | Mod: CPTII,S$GLB,, | Performed by: INTERNAL MEDICINE

## 2023-08-11 PROCEDURE — 1101F PT FALLS ASSESS-DOCD LE1/YR: CPT | Mod: CPTII,S$GLB,, | Performed by: INTERNAL MEDICINE

## 2023-08-11 PROCEDURE — 93000 ELECTROCARDIOGRAM COMPLETE: CPT | Mod: S$GLB,,, | Performed by: INTERNAL MEDICINE

## 2023-08-11 PROCEDURE — 1160F PR REVIEW ALL MEDS BY PRESCRIBER/CLIN PHARMACIST DOCUMENTED: ICD-10-PCS | Mod: CPTII,S$GLB,, | Performed by: INTERNAL MEDICINE

## 2023-08-11 PROCEDURE — 3288F FALL RISK ASSESSMENT DOCD: CPT | Mod: CPTII,S$GLB,, | Performed by: INTERNAL MEDICINE

## 2023-08-11 PROCEDURE — 1125F PR PAIN SEVERITY QUANTIFIED, PAIN PRESENT: ICD-10-PCS | Mod: CPTII,S$GLB,, | Performed by: INTERNAL MEDICINE

## 2023-08-11 PROCEDURE — 99999 PR PBB SHADOW E&M-EST. PATIENT-LVL III: CPT | Mod: PBBFAC,,, | Performed by: INTERNAL MEDICINE

## 2023-08-11 PROCEDURE — 1125F AMNT PAIN NOTED PAIN PRSNT: CPT | Mod: CPTII,S$GLB,, | Performed by: INTERNAL MEDICINE

## 2023-08-11 PROCEDURE — 93000 EKG 12-LEAD: ICD-10-PCS | Mod: S$GLB,,, | Performed by: INTERNAL MEDICINE

## 2023-08-11 PROCEDURE — 3008F PR BODY MASS INDEX (BMI) DOCUMENTED: ICD-10-PCS | Mod: CPTII,S$GLB,, | Performed by: INTERNAL MEDICINE

## 2023-08-11 PROCEDURE — 3008F BODY MASS INDEX DOCD: CPT | Mod: CPTII,S$GLB,, | Performed by: INTERNAL MEDICINE

## 2023-08-11 PROCEDURE — 3078F DIAST BP <80 MM HG: CPT | Mod: CPTII,S$GLB,, | Performed by: INTERNAL MEDICINE

## 2023-08-11 PROCEDURE — 3077F SYST BP >= 140 MM HG: CPT | Mod: CPTII,S$GLB,, | Performed by: INTERNAL MEDICINE

## 2023-08-11 PROCEDURE — 3288F PR FALLS RISK ASSESSMENT DOCUMENTED: ICD-10-PCS | Mod: CPTII,S$GLB,, | Performed by: INTERNAL MEDICINE

## 2023-08-11 PROCEDURE — 99999 PR PBB SHADOW E&M-EST. PATIENT-LVL III: ICD-10-PCS | Mod: PBBFAC,,, | Performed by: INTERNAL MEDICINE

## 2023-08-11 PROCEDURE — 99214 OFFICE O/P EST MOD 30 MIN: CPT | Mod: S$GLB,,, | Performed by: INTERNAL MEDICINE

## 2023-08-11 NOTE — PROGRESS NOTES
"CARDIOVASCULAR CONSULTATION    REASON FOR CONSULT:   Ronald Kelley is a 67 y.o. male who presents for Follow-up    HISTORY OF PRESENT ILLNESS:      Patient is a pleasant 67-year-old man.  Here for follow-up.  Recently admitted to the hospital where he underwent stress test and echocardiogram.  Stress test did not show any significant ischemia.  Echo showed normal left ventricle systolic function.  Needs to go for injections in his back and needs clearance for that.  Otherwise denies any chest pains at rest on exertion, orthopnea, PND.  Exercise tolerance greater than 4 Mets.            PAST MEDICAL HISTORY:     Past Medical History:   Diagnosis Date    Coronary artery disease     Hypertension        PAST SURGICAL HISTORY:     Past Surgical History:   Procedure Laterality Date    CORONARY STENT PLACEMENT         ALLERGIES AND MEDICATION:   Review of patient's allergies indicates:  No Known Allergies     Medication List            Accurate as of August 11, 2023 10:01 AM. If you have any questions, ask your nurse or doctor.                CONTINUE taking these medications      amLODIPine 5 MG tablet  Commonly known as: NORVASC     aspirin 81 MG EC tablet  Commonly known as: ECOTRIN     oxyCODONE 15 MG Tab  Commonly known as: ROXICODONE              SOCIAL HISTORY:     Social History     Socioeconomic History    Marital status:    Tobacco Use    Smoking status: Never    Smokeless tobacco: Never   Substance and Sexual Activity    Drug use: Never       FAMILY HISTORY:   No family history on file.    REVIEW OF SYSTEMS:   ROS    A 10 point review of systems was performed and all the pertinent positives have been mentioned. Rest of review of systems was negative.        PHYSICAL EXAM:     Vitals:    08/11/23 0956   BP: (!) 144/76   Pulse: 61   Resp: 18    Body mass index is 25.93 kg/m².  Weight: 79.6 kg (175 lb 9.5 oz)   Height: 5' 9" (175.3 cm)     Physical Exam      DATA:     Laboratory:  CBC:  Recent Labs   Lab " "05/31/23  0716   WBC 4.45   Hemoglobin 12.2 L   Hematocrit 38.0 L   Platelets 144 L       CHEMISTRIES:  Recent Labs   Lab 05/31/23  0716   Glucose 96   Sodium 139   Potassium 3.9   BUN 14   Creatinine 1.2   Calcium 9.3   Magnesium 1.6       CARDIAC BIOMARKERS:  Recent Labs   Lab 05/31/23  1005 05/31/23  1832 06/01/23  0017   Troponin I <0.006 <0.006 <0.006       COAGS:        LIPIDS/LFTS:  Recent Labs   Lab 05/31/23  0716 06/01/23  0532   Cholesterol  --  242 H   Triglycerides  --  130   HDL  --  45   LDL Cholesterol  --  171.0 H   Non-HDL Cholesterol  --  197   AST 22  --    ALT 18  --        No results found for: "HGBA1C"    TSH        The 10-year ASCVD risk score (Stewart NGUYEN, et al., 2019) is: 22.8%    Values used to calculate the score:      Age: 67 years      Sex: Male      Is Non- : Yes      Diabetic: No      Tobacco smoker: No      Systolic Blood Pressure: 144 mmHg      Is BP treated: Yes      HDL Cholesterol: 45 mg/dL      Total Cholesterol: 242 mg/dL       BNP    Lab Results   Component Value Date/Time    BNP <10 05/31/2023 07:16 AM             ASSESSMENT AND PLAN     Patient Active Problem List   Diagnosis    Anxiety    Arteriosclerosis of coronary artery    Chronic narcotic use    Degenerative joint disease involving multiple joints    HLD (hyperlipidemia)    Gout, unspecified    Essential hypertension    Iron deficiency anemia    Myalgia due to statin    Statin myopathy    Chest pain       * CAD   Stress test did not show any significant ischemia.  Echo showed normal left ventricle systolic function.      Essential hypertension   Well controlled at current therapy         HLD (hyperlipidemia)  As per patient has been intolerant to multiple statins.  States Repatha is too expensive for him.  Discussed leqvio with the patient.  Patient would like to think about it and let me know if he wants to initiate leqvio.   He states he will call me if he wants to start it.      Fu in 6 " m    Thank you very much for involving me in the care of your patient.  Please do not hesitate to contact me if there are any questions.      Krupa Claudio MD, FACC, [facility]  Interventional Cardiologist, [facility].           This note was dictated with the help of speech recognition software.  There might be un-intended errors and/or substitutions.

## 2023-08-17 ENCOUNTER — TELEPHONE (OUTPATIENT)
Dept: CARDIOLOGY | Facility: CLINIC | Age: 68
End: 2023-08-17
Payer: MEDICARE

## 2023-08-17 NOTE — TELEPHONE ENCOUNTER
This patient was contacted in regards of questions for medications. Per last after visit summary, I did not see anything stating new medications were started on 8/11/2023.

## 2023-08-17 NOTE — TELEPHONE ENCOUNTER
This patient called in today in regards to needing you to submit approval for levquio injection to be given every 3 months. States that he discussed with you at last visit and has reconsidered.

## 2023-08-17 NOTE — TELEPHONE ENCOUNTER
----- Message from Montse Pacheco sent at 8/17/2023  9:00 AM CDT -----  Regarding: self [phone]  Who called: self        What is the request in detail: pt stated he need the nurse to call him, its about medication that provider was suppose to prescribe him       Can the clinic reply by [facility]? No        Would the patient rather a call back or a response via [facility]? Call back       Best call back number:[phone]

## 2023-08-17 NOTE — TELEPHONE ENCOUNTER
----- Message from Kimberley Feliciano MA sent at 8/17/2023  3:34 PM CDT -----  Regarding: FW: Self [phone]    ----- Message -----  From: Estevan Morelos  Sent: 8/17/2023   3:31 PM CDT  To: [facility] Staff  Subject: Self [phone]                                Type:  Patient Returning Call    Who Called: Self     Who Left Message for Patient:  Sydni Cannon, Patient Care Assistant     Does the patient know what this is regarding?: yes     Would the patient rather a call back or a response via [facility]?  Call back     Best Call Back Number: [phone]     Additional Information:     Thank you.

## 2023-08-25 ENCOUNTER — TELEPHONE (OUTPATIENT)
Dept: CARDIOLOGY | Facility: CLINIC | Age: 68
End: 2023-08-25
Payer: MEDICARE

## 2023-08-25 RX ORDER — METHYLPREDNISOLONE SOD SUCC 125 MG
80 VIAL (EA) INJECTION ONCE
Status: CANCELLED | OUTPATIENT
Start: 2023-09-01

## 2023-08-25 RX ORDER — DIPHENHYDRAMINE HYDROCHLORIDE 50 MG/ML
50 INJECTION INTRAMUSCULAR; INTRAVENOUS EVERY 4 HOURS PRN
Status: CANCELLED | OUTPATIENT
Start: 2023-09-01

## 2023-08-25 RX ORDER — METHYLPREDNISOLONE SOD SUCC 125 MG
125 VIAL (EA) INJECTION
Status: CANCELLED | OUTPATIENT
Start: 2023-09-01

## 2023-08-25 RX ORDER — ONDANSETRON 2 MG/ML
8 INJECTION INTRAMUSCULAR; INTRAVENOUS ONCE AS NEEDED
Status: CANCELLED | OUTPATIENT
Start: 2023-09-01

## 2023-08-25 RX ORDER — DIPHENHYDRAMINE HYDROCHLORIDE 50 MG/ML
50 INJECTION INTRAMUSCULAR; INTRAVENOUS ONCE AS NEEDED
Status: CANCELLED | OUTPATIENT
Start: 2023-09-01

## 2023-08-25 RX ORDER — KETOROLAC TROMETHAMINE 30 MG/ML
15 INJECTION, SOLUTION INTRAMUSCULAR; INTRAVENOUS ONCE
Status: CANCELLED | OUTPATIENT
Start: 2023-09-01 | End: 2023-09-01

## 2023-08-25 RX ORDER — EPINEPHRINE 0.3 MG/.3ML
0.3 INJECTION SUBCUTANEOUS ONCE AS NEEDED
Status: CANCELLED | OUTPATIENT
Start: 2023-09-01

## 2023-08-25 RX ORDER — ACETAMINOPHEN 325 MG/1
650 TABLET ORAL EVERY 4 HOURS PRN
Status: CANCELLED | OUTPATIENT
Start: 2023-09-01

## 2023-08-25 NOTE — TELEPHONE ENCOUNTER
----- Message from Montse Pacheco sent at 8/25/2023  1:38 PM CDT -----  Regarding: self [phone]  Who called: self        What is the request in detail: pt stated he like nurse to call him in regards to meds       Can the clinic reply by [facility]? No        Would the patient rather a call back or a response via [facility]? Call back       Best call back number:[phone]

## 2023-08-31 ENCOUNTER — TELEPHONE (OUTPATIENT)
Dept: CARDIOLOGY | Facility: CLINIC | Age: 68
End: 2023-08-31
Payer: MEDICARE

## 2023-08-31 NOTE — TELEPHONE ENCOUNTER
Attempted to contact patient in regards to injections. He stated that he needs an appointment; however, we are having bad phone reception at the moment.

## 2023-08-31 NOTE — TELEPHONE ENCOUNTER
----- Message from Khadra Joe sent at 8/31/2023  2:03 PM CDT -----  Type: Patient Call Back    Who called:pt     What is the request in detail:pt requesting to speak to nurse in regards to an injection. Call pt     Can the clinic reply by [facility]?    Would the patient rather a call back or a response via [facility]? call    Best call back number:[phone] (home)       Additional Information:

## 2023-09-01 ENCOUNTER — TELEPHONE (OUTPATIENT)
Dept: CARDIOLOGY | Facility: CLINIC | Age: 68
End: 2023-09-01
Payer: MEDICARE

## 2023-09-01 NOTE — TELEPHONE ENCOUNTER
----- Message from Montse Pacheco sent at 9/1/2023  8:29 AM CDT -----  Regarding: self [phone]  Who called: self        What is the request in detail:pt calling to schedule appt for leqico inj       Can the clinic reply by [facility]? No        Would the patient rather a call back or a response via [facility]? Call back       Best call back number:[phone]

## 2023-09-01 NOTE — TELEPHONE ENCOUNTER
Spoke with patient in regards to needing an appointment set up to have his injections. I sent a message over to Dr. Claudio in regards to where to direct him. Awaiting response. Informed the patient that I would keep him updated.

## 2023-09-14 ENCOUNTER — INFUSION (OUTPATIENT)
Dept: INFUSION THERAPY | Facility: HOSPITAL | Age: 68
End: 2023-09-14
Attending: INTERNAL MEDICINE
Payer: MEDICARE

## 2023-09-14 VITALS
HEART RATE: 61 BPM | DIASTOLIC BLOOD PRESSURE: 78 MMHG | RESPIRATION RATE: 18 BRPM | OXYGEN SATURATION: 97 % | TEMPERATURE: 98 F | SYSTOLIC BLOOD PRESSURE: 159 MMHG

## 2023-09-14 DIAGNOSIS — E78.5 HYPERLIPIDEMIA, UNSPECIFIED HYPERLIPIDEMIA TYPE: Primary | ICD-10-CM

## 2023-09-14 PROCEDURE — 63600175 PHARM REV CODE 636 W HCPCS: Mod: JZ,TB | Performed by: INTERNAL MEDICINE

## 2023-09-14 PROCEDURE — 96372 THER/PROPH/DIAG INJ SC/IM: CPT

## 2023-09-14 RX ORDER — EPINEPHRINE 0.3 MG/.3ML
0.3 INJECTION SUBCUTANEOUS ONCE AS NEEDED
OUTPATIENT
Start: 2023-10-01

## 2023-09-14 RX ORDER — DIPHENHYDRAMINE HYDROCHLORIDE 50 MG/ML
50 INJECTION INTRAMUSCULAR; INTRAVENOUS EVERY 4 HOURS PRN
OUTPATIENT
Start: 2023-10-01

## 2023-09-14 RX ORDER — DIPHENHYDRAMINE HYDROCHLORIDE 50 MG/ML
50 INJECTION INTRAMUSCULAR; INTRAVENOUS ONCE AS NEEDED
OUTPATIENT
Start: 2023-10-01

## 2023-09-14 RX ORDER — METHYLPREDNISOLONE SOD SUCC 125 MG
125 VIAL (EA) INJECTION
OUTPATIENT
Start: 2023-10-01

## 2023-09-14 RX ORDER — ONDANSETRON 2 MG/ML
8 INJECTION INTRAMUSCULAR; INTRAVENOUS ONCE AS NEEDED
OUTPATIENT
Start: 2023-10-01

## 2023-09-14 RX ORDER — KETOROLAC TROMETHAMINE 30 MG/ML
15 INJECTION, SOLUTION INTRAMUSCULAR; INTRAVENOUS ONCE
OUTPATIENT
Start: 2023-10-01 | End: 2023-10-01

## 2023-09-14 RX ORDER — METHYLPREDNISOLONE SOD SUCC 125 MG
80 VIAL (EA) INJECTION ONCE
OUTPATIENT
Start: 2023-10-01

## 2023-09-14 RX ORDER — ACETAMINOPHEN 325 MG/1
650 TABLET ORAL EVERY 4 HOURS PRN
OUTPATIENT
Start: 2023-10-01

## 2023-09-14 RX ADMIN — INCLISIRAN 284 MG: 284 INJECTION, SOLUTION SUBCUTANEOUS at 08:09

## 2023-09-14 NOTE — PLAN OF CARE
Pt arrived to unit, ambulatory, for injection therapy Leqvio. Pt alert and oriented, no complaints upon arrival. Pt states he has chronic shoulder and back pain. Tolerated Leqvio injection with some pain at the injection site but otherwise no problems. Written and verbal education provided to pt with verbal understanding. Next appointment provided. Discharged home upon completion in [facility].

## 2023-10-30 ENCOUNTER — TELEPHONE (OUTPATIENT)
Dept: CARDIOLOGY | Facility: CLINIC | Age: 68
End: 2023-10-30
Payer: MEDICARE

## 2023-10-31 ENCOUNTER — TELEPHONE (OUTPATIENT)
Dept: CARDIOLOGY | Facility: CLINIC | Age: 68
End: 2023-10-31
Payer: MEDICARE

## 2023-10-31 NOTE — TELEPHONE ENCOUNTER
----- Message from Charles Camarena sent at 10/31/2023 11:38 AM CDT -----  Regarding: self  Type:  Patient Returning Call    Who Called:self    Who Left Message for Patient: Kimberley Feliciano MA    Does the patient know what this is regarding?:leqvio brochure [facility].    Would the patient rather a call back or a response via [facility]?callback    Best Call Back Number:[phone]    Additional Information:

## 2023-10-31 NOTE — TELEPHONE ENCOUNTER
Patient called in regards of taking his injection for levqujuan m. He states that after taking it, they have sent a bill of 650.00 dollars, patient states that he can not afford it.      I will send a message to someone else in regards to needing assistance for this bill.     Please advise as he no longer wants to take it.

## 2023-11-01 ENCOUNTER — TELEPHONE (OUTPATIENT)
Dept: CARDIOLOGY | Facility: CLINIC | Age: 68
End: 2023-11-01
Payer: MEDICARE

## 2023-11-01 NOTE — TELEPHONE ENCOUNTER
----- Message from Sydni Ramesh MA sent at 11/1/2023  8:05 AM CDT -----  Regarding: FW: self [phone]    ----- Message -----  From: Suzi Aj  Sent: 10/31/2023   4:29 PM CDT  To: [facility]  Subject: self [phone]                                .Type: Patient Call Back    Who called: self    What is the request in detail: patient requesting a call back in regards to medication.    Can the clinic reply by [facility]? no    Would the patient rather a call back or a response via [facility]? Call back     Best call back number [phone]

## 2023-12-28 ENCOUNTER — TELEPHONE (OUTPATIENT)
Dept: CARDIOLOGY | Facility: CLINIC | Age: 68
End: 2023-12-28
Payer: MEDICARE

## 2023-12-28 NOTE — TELEPHONE ENCOUNTER
----- Message from Guillermo Nicholas sent at 12/28/2023 11:33 AM CST -----  Type: Patient Call Back    Who called:Self    What is the request in detail:Pt would like to speak to Gracie    Can the clinic reply by [facility]?No    Would the patient rather a call back or a response via [facility]? Call    Best call back number:.[phone]   Additional Information:

## 2024-04-04 ENCOUNTER — TELEPHONE (OUTPATIENT)
Dept: CARDIOLOGY | Facility: CLINIC | Age: 69
End: 2024-04-04
Payer: MEDICARE

## 2024-04-04 NOTE — TELEPHONE ENCOUNTER
Spoke with patient, he is requesting to speak with you regarding this matter. Attempted to assist the patient and he only wanted to speak with you regarding if he will have to pay for medication.

## 2024-04-04 NOTE — TELEPHONE ENCOUNTER
----- Message from Suzi Aj sent at 4/4/2024 12:23 PM CDT -----  Regarding: self [phone]  .Type: Patient Call Back    Who called:self    What is the request in detail: patient requesting a call back regarding medication LEVIQUE. Patient has questions and concerns.    Can the clinic reply by [facility]?no    Would the patient rather a call back or a response via [facility]?call back     Best call back number [phone]

## 2025-07-31 ENCOUNTER — HOSPITAL ENCOUNTER (EMERGENCY)
Facility: HOSPITAL | Age: 70
Discharge: HOME | End: 2025-07-31
Attending: EMERGENCY MEDICINE
Payer: MEDICARE

## 2025-07-31 VITALS
HEIGHT: 68 IN | BODY MASS INDEX: 26.52 KG/M2 | OXYGEN SATURATION: 98 % | WEIGHT: 175 LBS | RESPIRATION RATE: 18 BRPM | HEART RATE: 60 BPM | DIASTOLIC BLOOD PRESSURE: 100 MMHG | SYSTOLIC BLOOD PRESSURE: 222 MMHG

## 2025-07-31 DIAGNOSIS — I16.0 HYPERTENSIVE URGENCY: ICD-10-CM

## 2025-07-31 DIAGNOSIS — M54.41 CHRONIC RIGHT-SIDED LOW BACK PAIN WITH RIGHT-SIDED SCIATICA: Primary | ICD-10-CM

## 2025-07-31 DIAGNOSIS — I10 HTN (HYPERTENSION): ICD-10-CM

## 2025-07-31 DIAGNOSIS — G89.29 CHRONIC RIGHT-SIDED LOW BACK PAIN WITH RIGHT-SIDED SCIATICA: Primary | ICD-10-CM

## 2025-07-31 LAB
ABSOLUTE EOSINOPHIL (OHS): 0.04 K/UL
ABSOLUTE MONOCYTE (OHS): 0.64 K/UL (ref 0.3–1)
ABSOLUTE NEUTROPHIL COUNT (OHS): 4.91 K/UL (ref 1.8–7.7)
ALBUMIN SERPL BCP-MCNC: 4.6 G/DL (ref 3.5–5.2)
ALP SERPL-CCNC: 43 UNIT/L (ref 40–150)
ALT SERPL W/O P-5'-P-CCNC: 18 UNIT/L (ref 10–44)
ANION GAP (OHS): 9 MMOL/L (ref 8–16)
AST SERPL-CCNC: 27 UNIT/L (ref 11–45)
BACTERIA #/AREA URNS AUTO: ABNORMAL /HPF
BASOPHILS # BLD AUTO: 0.04 K/UL
BASOPHILS NFR BLD AUTO: 0.5 %
BILIRUB SERPL-MCNC: 1.2 MG/DL (ref 0.1–1)
BILIRUB UR QL STRIP.AUTO: NEGATIVE
BUN SERPL-MCNC: 13 MG/DL (ref 8–23)
CALCIUM SERPL-MCNC: 9.4 MG/DL (ref 8.7–10.5)
CHLORIDE SERPL-SCNC: 101 MMOL/L (ref 95–110)
CLARITY UR: CLEAR
CO2 SERPL-SCNC: 27 MMOL/L (ref 23–29)
COLOR UR AUTO: YELLOW
CREAT SERPL-MCNC: 1.1 MG/DL (ref 0.5–1.4)
ERYTHROCYTE [DISTWIDTH] IN BLOOD BY AUTOMATED COUNT: 13 % (ref 11.5–14.5)
GFR SERPLBLD CREATININE-BSD FMLA CKD-EPI: >60 ML/MIN/1.73/M2
GLUCOSE SERPL-MCNC: 106 MG/DL (ref 70–110)
GLUCOSE UR QL STRIP: NEGATIVE
HCT VFR BLD AUTO: 41.5 % (ref 40–54)
HGB BLD-MCNC: 13.3 GM/DL (ref 14–18)
HGB UR QL STRIP: ABNORMAL
IMM GRANULOCYTES # BLD AUTO: 0.03 K/UL (ref 0–0.04)
IMM GRANULOCYTES NFR BLD AUTO: 0.4 % (ref 0–0.5)
KETONES UR QL STRIP: NEGATIVE
LEUKOCYTE ESTERASE UR QL STRIP: NEGATIVE
LYMPHOCYTES # BLD AUTO: 1.71 K/UL (ref 1–4.8)
MCH RBC QN AUTO: 26.2 PG (ref 27–31)
MCHC RBC AUTO-ENTMCNC: 32 G/DL (ref 32–36)
MCV RBC AUTO: 82 FL (ref 82–98)
MICROSCOPIC COMMENT: ABNORMAL
NITRITE UR QL STRIP: NEGATIVE
NUCLEATED RBC (/100WBC) (OHS): 0 /100 WBC
PH UR STRIP: 7 [PH]
PLATELET # BLD AUTO: 151 K/UL (ref 150–450)
PMV BLD AUTO: 12 FL (ref 9.2–12.9)
POTASSIUM SERPL-SCNC: 3.8 MMOL/L (ref 3.5–5.1)
PROT SERPL-MCNC: 7.8 GM/DL (ref 6–8.4)
PROT UR QL STRIP: NEGATIVE
RBC # BLD AUTO: 5.08 M/UL (ref 4.6–6.2)
RBC #/AREA URNS AUTO: 17 /HPF (ref 0–4)
RELATIVE EOSINOPHIL (OHS): 0.5 %
RELATIVE LYMPHOCYTE (OHS): 23.2 % (ref 18–48)
RELATIVE MONOCYTE (OHS): 8.7 % (ref 4–15)
RELATIVE NEUTROPHIL (OHS): 66.7 % (ref 38–73)
SODIUM SERPL-SCNC: 137 MMOL/L (ref 136–145)
SP GR UR STRIP: 1.01
UROBILINOGEN UR STRIP-ACNC: NEGATIVE EU/DL
WBC # BLD AUTO: 7.37 K/UL (ref 3.9–12.7)
WBC #/AREA URNS AUTO: 0 /HPF (ref 0–5)

## 2025-07-31 PROCEDURE — 80053 COMPREHEN METABOLIC PANEL: CPT

## 2025-07-31 PROCEDURE — 99284 EMERGENCY DEPT VISIT MOD MDM: CPT | Mod: 25

## 2025-07-31 PROCEDURE — 93005 ELECTROCARDIOGRAM TRACING: CPT

## 2025-07-31 PROCEDURE — 85025 COMPLETE CBC W/AUTO DIFF WBC: CPT

## 2025-07-31 PROCEDURE — 25000003 PHARM REV CODE 250

## 2025-07-31 PROCEDURE — 93010 ELECTROCARDIOGRAM REPORT: CPT | Mod: ,,, | Performed by: INTERNAL MEDICINE

## 2025-07-31 PROCEDURE — 96374 THER/PROPH/DIAG INJ IV PUSH: CPT

## 2025-07-31 PROCEDURE — 81003 URINALYSIS AUTO W/O SCOPE: CPT

## 2025-07-31 PROCEDURE — 63600175 PHARM REV CODE 636 W HCPCS

## 2025-07-31 PROCEDURE — 96372 THER/PROPH/DIAG INJ SC/IM: CPT

## 2025-07-31 RX ORDER — MORPHINE SULFATE 4 MG/ML
6 INJECTION, SOLUTION INTRAMUSCULAR; INTRAVENOUS
Status: COMPLETED | OUTPATIENT
Start: 2025-07-31 | End: 2025-07-31

## 2025-07-31 RX ORDER — ONDANSETRON 4 MG/1
4 TABLET, ORALLY DISINTEGRATING ORAL
Status: COMPLETED | OUTPATIENT
Start: 2025-07-31 | End: 2025-07-31

## 2025-07-31 RX ORDER — HYDROMORPHONE HYDROCHLORIDE 1 MG/ML
1 INJECTION, SOLUTION INTRAMUSCULAR; INTRAVENOUS; SUBCUTANEOUS
Refills: 0 | Status: COMPLETED | OUTPATIENT
Start: 2025-07-31 | End: 2025-07-31

## 2025-07-31 RX ORDER — AMLODIPINE BESYLATE 5 MG/1
5 TABLET ORAL
Status: COMPLETED | OUTPATIENT
Start: 2025-07-31 | End: 2025-07-31

## 2025-07-31 RX ORDER — CYCLOBENZAPRINE HCL 5 MG
5 TABLET ORAL 3 TIMES DAILY PRN
Qty: 20 TABLET | Refills: 0 | Status: SHIPPED | OUTPATIENT
Start: 2025-07-31 | End: 2025-08-10

## 2025-07-31 RX ORDER — LIDOCAINE 50 MG/G
1 PATCH TOPICAL ONCE
Qty: 15 PATCH | Refills: 0 | Status: SHIPPED | OUTPATIENT
Start: 2025-07-31 | End: 2025-07-31

## 2025-07-31 RX ADMIN — MORPHINE SULFATE 6 MG: 4 INJECTION, SOLUTION INTRAMUSCULAR; INTRAVENOUS at 03:07

## 2025-07-31 RX ADMIN — ONDANSETRON 4 MG: 4 TABLET, ORALLY DISINTEGRATING ORAL at 03:07

## 2025-07-31 RX ADMIN — AMLODIPINE BESYLATE 5 MG: 5 TABLET ORAL at 04:07

## 2025-07-31 RX ADMIN — HYDROMORPHONE HYDROCHLORIDE 1 MG: 1 INJECTION, SOLUTION INTRAMUSCULAR; INTRAVENOUS; SUBCUTANEOUS at 04:07

## 2025-07-31 NOTE — ED PROVIDER NOTES
"Encounter Date: 7/31/2025       History     Chief Complaint   Patient presents with    Leg Pain     C/o left side leg/sciatic pain. States saw ortho yesterday was given a couple injections. Some relief. Pain is worse today.     69-year-old male with past medical history of CAD, hypertension presents to ED for emergent evaluation of right lumbar back pain that radiates down his right lower extremity that started yesterday after working around the house.  He denies any direct trauma to his back.  He denies any head trauma or LOC.  He reports his pain feels similar to his history of sciatica.  He states he normally has left lower extremity pain and left lumbar back pain.  He presented to his orthopedist 3 days ago for chronic back pain where he was given a steroid shot and Toradol shot with relief.  He states his right lumbar back pain and right lower extremity pain happened after he saw the orthopedist.  He denies any bladder/bowel incontinence, saddle anesthesia, fever, chills, chest pain, shortness of breath, abdominal pain, nausea, vomiting, diarrhea, dysuria, hematuria, weakness, dizziness, lightheadedness, headache.  Patient normally does not take any antihypertensive medications.  He states that he was prescribed 5 mg amlodipine by his primary care provider a few months ago; however, he never took it because he states his blood pressure at home has been "good. " He states his blood pressure at home systolic is usually in the 140s.  He states his blood pressure is usually elevated when he is in pain and whenever he goes to the doctor.  No other symptoms reported.    The history is provided by the patient. No  was used.     Review of patient's allergies indicates:  No Known Allergies  Past Medical History:   Diagnosis Date    Coronary artery disease     Hypertension      Past Surgical History:   Procedure Laterality Date    CORONARY STENT PLACEMENT       No family history on file.  Social " History[1]  Review of Systems   Constitutional:  Negative for chills and fever.   HENT:  Negative for congestion, ear pain, rhinorrhea and sore throat.    Eyes:  Negative for redness.   Respiratory:  Negative for cough and shortness of breath.    Cardiovascular:  Negative for chest pain.   Gastrointestinal:  Negative for abdominal pain, diarrhea, nausea and vomiting.   Genitourinary:  Negative for decreased urine volume, difficulty urinating, dysuria, frequency, hematuria and urgency.        (-) bladder/bowel incontinence  (-) saddle anesthesia   Musculoskeletal:  Positive for back pain. Negative for neck pain.   Skin:  Negative for rash.   Neurological:  Negative for headaches.        (-) head trauma  (-) LOC   Psychiatric/Behavioral:  Negative for confusion.        Physical Exam     Initial Vitals [07/31/25 1444]   BP Pulse Resp Temp SpO2   (!) 220/101 62 19 -- 98 %      MAP       --         Physical Exam    Nursing note and vitals reviewed.  Constitutional: He appears well-developed and well-nourished. He is not diaphoretic.  Non-toxic appearance. No distress.   HENT:   Head: Normocephalic and atraumatic.   Right Ear: Hearing, tympanic membrane, external ear and ear canal normal. Tympanic membrane is not perforated, not erythematous and not bulging.   Left Ear: Hearing, tympanic membrane, external ear and ear canal normal. Tympanic membrane is not perforated, not erythematous and not bulging.   Nose: Nose normal. Mouth/Throat: Uvula is midline and oropharynx is clear and moist.   Eyes: Conjunctivae and EOM are normal.   Neck: Neck supple.   Normal range of motion.   Full passive range of motion without pain.     Cardiovascular:            Pulses:       Radial pulses are 2+ on the right side and 2+ on the left side.   Pulmonary/Chest: Effort normal and breath sounds normal. No respiratory distress. He has no decreased breath sounds.   Musculoskeletal:      Cervical back: Full passive range of motion without pain,  normal range of motion and neck supple. No rigidity.      Comments: Full ROM of neck. No neck rigidity. No midline tenderness to cervical, thoracic, or lumbar spine.  No bony step-offs.  Full range of motion of bilateral upper and lower extremities.  Strength and sensation intact bilateral upper and lower extremities.  Patient able to ambulate into the room.  No erythema, edema, bruising, rash, or cellulitis patient's back.      Neurological: He is alert. No cranial nerve deficit.   Neuro intact.  Strength and sensation intact to bilateral upper and lower extremities.   Skin: Skin is warm and dry. No rash noted.         ED Course   Procedures  Labs Reviewed   COMPREHENSIVE METABOLIC PANEL - Abnormal       Result Value    Sodium 137      Potassium 3.8      Chloride 101      CO2 27      Glucose 106      BUN 13      Creatinine 1.1      Calcium 9.4      Protein Total 7.8      Albumin 4.6      Bilirubin Total 1.2 (*)     ALP 43      AST 27      ALT 18      Anion Gap 9      eGFR >60     URINALYSIS, REFLEX TO URINE CULTURE - Abnormal    Color, UA Yellow      Appearance, UA Clear      pH, UA 7.0      Spec Grav UA 1.015      Protein, UA Negative      Glucose, UA Negative      Ketones, UA Negative      Bilirubin, UA Negative      Blood, UA 1+ (*)     Nitrites, UA Negative      Urobilinogen, UA Negative      Leukocyte Esterase, UA Negative     CBC WITH DIFFERENTIAL - Abnormal    WBC 7.37      RBC 5.08      HGB 13.3 (*)     HCT 41.5      MCV 82      MCH 26.2 (*)     MCHC 32.0      RDW 13.0      Platelet Count 151      MPV 12.0      Nucleated RBC 0      Neut % 66.7      Lymph % 23.2      Mono % 8.7      Eos % 0.5      Basophil % 0.5      Imm Grans % 0.4      Neut # 4.91      Lymph # 1.71      Mono # 0.64      Eos # 0.04      Baso # 0.04      Imm Grans # 0.03     URINALYSIS MICROSCOPIC - Abnormal    RBC, UA 17 (*)     WBC, UA 0      Bacteria, UA Rare      Microscopic Comment       CBC W/ AUTO DIFFERENTIAL    Narrative:     The  following orders were created for panel order CBC auto differential.  Procedure                               Abnormality         Status                     ---------                               -----------         ------                     CBC with Differential[[id number]]       Abnormal            Final result                 Please view results for these tests on the individual orders.   [place] URINE HOLD     EKG Readings: (Independently Interpreted)   EKG reveals sinus bradycardia at a rate of 57.  No STEMI.  Pr interval 182.  .  Normal axis.  EKG signed by Dr. Ham.       Imaging Results    None          Medications   morphine injection 6 mg (6 mg Intramuscular Given 7/31/25 1530)   ondansetron disintegrating tablet 4 mg (4 mg Oral Given 7/31/25 1531)   HYDROmorphone injection 1 mg (1 mg Intravenous Given 7/31/25 1652)   amLODIPine tablet 5 mg (5 mg Oral Given 7/31/25 1652)     Medical Decision Making  69-year-old male with past medical history of CAD, hypertension presents to ED for emergent evaluation of right lumbar back pain that radiates down his right lower extremity that started yesterday after working around the house.  He denies any direct trauma to his back.  He denies any head trauma or LOC.  He reports his pain feels similar to his history of sciatica.  He states he normally has left lower extremity pain and left lumbar back pain.  He presented to his orthopedist 3 days ago for chronic back pain where he was given a steroid shot and Toradol shot with relief.  He states his right lumbar back pain and right lower extremity pain happened after he saw the orthopedist.  He denies any bladder/bowel incontinence, saddle anesthesia, fever, chills, chest pain, shortness of breath, abdominal pain, nausea, vomiting, diarrhea, dysuria, hematuria, weakness, dizziness, lightheadedness, headache.  Patient normally does not take any antihypertensive medications.  He states that he was prescribed 5 mg  "amlodipine by his primary care provider a few months ago; however, he never took it because he states his blood pressure at home has been "good. " He states his blood pressure at home systolic is usually in the 140s.  He states his blood pressure is usually elevated when he is in pain and whenever he goes to the doctor.  No other symptoms reported.    On physical exam, patient is well-appearing and in no acute distress.  Nontoxic appearing.  Lungs are clear to auscultation bilaterally.  Abdomen is soft and nontender.  No guarding, rigidity, rebound.  2+ radial pulses bilaterally.  Posterior oropharynx is not erythematous.  No edema or exudate.  Uvula midline.  Bilateral tympanic membrane is normal.  No erythema, bulging, or perforations.  Neuro intact.  Strength and sensation intact bilateral upper and lower extremities. Full ROM of neck. No neck rigidity. No midline tenderness to cervical, thoracic, or lumbar spine.  No bony step-offs.  Full range of motion of bilateral upper and lower extremities.  Strength and sensation intact bilateral upper and lower extremities.  Patient able to ambulate into the room.  No erythema, edema, bruising, rash, or cellulitis patient's back.  No CVA tenderness bilaterally.  EKG reveals sinus bradycardia at a rate of 57.  No STEMI.  Pr interval 182.  .  Normal axis.  EKG signed by Dr. Ham.  Patient's blood pressure is 220/101.  He is not having any chest pain, shortness of breath, headache, lightheadedness, dizziness, weakness.  Morphine and Zofran ordered.  will reassess.    Upon reassessment, patient's blood pressure is still elevated.  We will get basic labs, and UA for further evaluation.  Dilaudid, amlodipine ordered.  CBC without evidence of any leukocytosis.  Hemoglobin 13.3.  CMP with total bili 1.2.  Normal renal function.  Otherwise no other electrolyte abnormality.  UA with 1+ blood, 17 red blood cells.  Patient is not having any urinary complaints.  Upon " reassessment, patient does report relief to his pain.  No signs of hypertensive emergency at this time.  He denies any chest pain, shortness of breath, headache, dizziness, lightheadedness, weakness.  will discharge patient on Flexeril and Lidoderm patches.  Urged prompt follow up with PCP and orthopedist for further evaluation.    Strict return precautions given. I discussed with the patient/family the diagnosis, treatment plan, indications for return to the emergency department, and for expected follow-up. The patient/family verbalized an understanding. The patient/family is asked if there are any questions or concerns. We discuss the case, until all issues are addressed to the patient/family's satisfaction. Patient/family understands and is agreeable to the plan. Patient is stable and ready for discharge.      Amount and/or Complexity of Data Reviewed  Labs: ordered.    Risk  Prescription drug management.                                          Clinical Impression:  Final diagnoses:  [I10] HTN (hypertension)  [M54.41, G89.29] Chronic right-sided low back pain with right-sided sciatica (Primary)  [I16.0] Hypertensive urgency          ED Disposition Condition    Discharge Stable          ED Prescriptions       Medication Sig Dispense Start Date End Date Auth. Provider    cyclobenzaprine (FLEXERIL) 5 MG tablet Take 1 tablet (5 mg total) by mouth 3 (three) times daily as needed for Muscle spasms. 20 tablet 7/31/2025 8/10/2025 Queenie Strickland PA-C    LIDOcaine (LIDODERM) 5 % (Expires today) Place 1 patch onto the skin once. Remove & Discard patch within 12 hours or as directed by MD for 1 dose 15 patch 7/31/2025 7/31/2025 Queenie Strickland PA-C          Follow-up Information       Follow up With Specialties Details Why Contact Info    Jacinta King MD Family Medicine In 2 days for further evaluation [address]  [address]  [place] [address]  [phone]      [facility] [place] - Emergency Dept Emergency  Medicine In 2 days If symptoms worsen Aric Mccartney denise  [facility] [place] [address] [phone]                   [1]   Social History  Tobacco Use    Smoking status: Never    Smokeless tobacco: Never   Substance Use Topics    Drug use: Never        Queenie Strickland PA-C  07/31/25 1901

## 2025-07-31 NOTE — DISCHARGE INSTRUCTIONS
Please return to the Emergency Department for any new or worsening symptoms including: bladder/bowel incontinence, saddle anesthesia, fever, chest pain, shortness of breath, loss of consciousness, dizziness, weakness, or any other concerns.     Please follow up with your Primary Care Provider within in the week. If you do not have one, you may contact the one listed on your discharge paperwork or you may also call the [facility] Appointment Desk at [phone] to schedule an appointment with one.     Please take all medication as prescribed.

## 2025-07-31 NOTE — ED TRIAGE NOTES
Pt reports right leg pain. He was seen by ortho on Monday and was seen for the same pain. He was given some injections which did help. He states yesterday he was doing an activity that caused the pain to start back. The pain starts on the right side of the back and radiates down the right leg

## 2025-08-01 LAB
HOLD SPECIMEN: NORMAL
OHS QRS DURATION: 110 MS
OHS QTC CALCULATION: 430 MS